# Patient Record
Sex: FEMALE | Race: OTHER | Employment: FULL TIME | URBAN - METROPOLITAN AREA
[De-identification: names, ages, dates, MRNs, and addresses within clinical notes are randomized per-mention and may not be internally consistent; named-entity substitution may affect disease eponyms.]

---

## 2019-01-24 ENCOUNTER — HOSPITAL ENCOUNTER (EMERGENCY)
Age: 39
Discharge: HOME | End: 2019-01-24
Payer: COMMERCIAL

## 2019-01-24 VITALS
OXYGEN SATURATION: 97 % | HEIGHT: 63 IN | SYSTOLIC BLOOD PRESSURE: 130 MMHG | RESPIRATION RATE: 15 BRPM | BODY MASS INDEX: 25.69 KG/M2 | DIASTOLIC BLOOD PRESSURE: 86 MMHG | TEMPERATURE: 98.7 F | HEART RATE: 75 BPM | WEIGHT: 145 LBS

## 2019-01-24 DIAGNOSIS — R68.84 JAW PAIN: Primary | ICD-10-CM

## 2019-01-24 DIAGNOSIS — K08.89 PAIN, DENTAL: ICD-10-CM

## 2019-01-24 PROCEDURE — 6370000000 HC RX 637 (ALT 250 FOR IP): Performed by: PHYSICIAN ASSISTANT

## 2019-01-24 PROCEDURE — 99282 EMERGENCY DEPT VISIT SF MDM: CPT

## 2019-01-24 RX ADMIN — LIDOCAINE HYDROCHLORIDE 15 ML: 20 SOLUTION ORAL; TOPICAL at 11:38

## 2019-01-24 ASSESSMENT — ENCOUNTER SYMPTOMS
COUGH: 0
FACIAL SWELLING: 0
ABDOMINAL PAIN: 0
CHEST TIGHTNESS: 0
EYE REDNESS: 0
SORE THROAT: 0
EYE PAIN: 0
BACK PAIN: 0
DIARRHEA: 0
CONSTIPATION: 0
SHORTNESS OF BREATH: 0
RHINORRHEA: 0
NAUSEA: 0

## 2019-01-24 ASSESSMENT — PAIN DESCRIPTION - ORIENTATION: ORIENTATION: LEFT

## 2019-01-24 ASSESSMENT — PAIN DESCRIPTION - PAIN TYPE: TYPE: ACUTE PAIN

## 2019-01-24 ASSESSMENT — PAIN DESCRIPTION - LOCATION: LOCATION: MOUTH

## 2019-01-24 ASSESSMENT — PAIN SCALES - GENERAL: PAINLEVEL_OUTOF10: 9

## 2021-07-24 ENCOUNTER — HOSPITAL ENCOUNTER (EMERGENCY)
Age: 41
Discharge: HOME | End: 2021-07-24
Attending: EMERGENCY MEDICINE
Payer: COMMERCIAL

## 2021-07-24 ENCOUNTER — APPOINTMENT (OUTPATIENT)
Dept: GENERAL RADIOLOGY | Age: 41
End: 2021-07-24
Payer: COMMERCIAL

## 2021-07-24 VITALS
SYSTOLIC BLOOD PRESSURE: 127 MMHG | HEIGHT: 63 IN | HEART RATE: 65 BPM | DIASTOLIC BLOOD PRESSURE: 89 MMHG | OXYGEN SATURATION: 99 % | BODY MASS INDEX: 25.69 KG/M2 | TEMPERATURE: 98.4 F | WEIGHT: 145 LBS | RESPIRATION RATE: 26 BRPM

## 2021-07-24 DIAGNOSIS — R42 DIZZINESS: Primary | ICD-10-CM

## 2021-07-24 LAB
A/G RATIO: 1.6 (ref 1.1–2.2)
ALBUMIN SERPL-MCNC: 4.5 G/DL (ref 3.4–5)
ALP BLD-CCNC: 67 U/L (ref 40–129)
ALT SERPL-CCNC: 15 U/L (ref 10–40)
ANION GAP SERPL CALCULATED.3IONS-SCNC: 10 MMOL/L (ref 3–16)
AST SERPL-CCNC: 17 U/L (ref 15–37)
BASOPHILS ABSOLUTE: 0 K/UL (ref 0–0.2)
BASOPHILS RELATIVE PERCENT: 0.8 %
BILIRUB SERPL-MCNC: 0.7 MG/DL (ref 0–1)
BILIRUBIN URINE: NEGATIVE
BLOOD, URINE: NEGATIVE
BUN BLDV-MCNC: 14 MG/DL (ref 7–20)
CALCIUM SERPL-MCNC: 9.6 MG/DL (ref 8.3–10.6)
CHLORIDE BLD-SCNC: 101 MMOL/L (ref 99–110)
CLARITY: CLEAR
CO2: 27 MMOL/L (ref 21–32)
COLOR: YELLOW
CREAT SERPL-MCNC: 0.6 MG/DL (ref 0.6–1.1)
EKG ATRIAL RATE: 53 BPM
EKG DIAGNOSIS: NORMAL
EKG P AXIS: 12 DEGREES
EKG P-R INTERVAL: 158 MS
EKG Q-T INTERVAL: 442 MS
EKG QRS DURATION: 128 MS
EKG QTC CALCULATION (BAZETT): 414 MS
EKG R AXIS: -11 DEGREES
EKG T AXIS: -4 DEGREES
EKG VENTRICULAR RATE: 53 BPM
EOSINOPHILS ABSOLUTE: 0.1 K/UL (ref 0–0.6)
EOSINOPHILS RELATIVE PERCENT: 2.4 %
GFR AFRICAN AMERICAN: >60
GFR NON-AFRICAN AMERICAN: >60
GLOBULIN: 2.9 G/DL
GLUCOSE BLD-MCNC: 83 MG/DL (ref 70–99)
GLUCOSE URINE: NEGATIVE MG/DL
HCG QUALITATIVE: NEGATIVE
HCT VFR BLD CALC: 40.4 % (ref 36–48)
HEMOGLOBIN: 14.2 G/DL (ref 12–16)
KETONES, URINE: NEGATIVE MG/DL
LEUKOCYTE ESTERASE, URINE: NEGATIVE
LYMPHOCYTES ABSOLUTE: 1.9 K/UL (ref 1–5.1)
LYMPHOCYTES RELATIVE PERCENT: 34.1 %
MCH RBC QN AUTO: 31.8 PG (ref 26–34)
MCHC RBC AUTO-ENTMCNC: 35.1 G/DL (ref 31–36)
MCV RBC AUTO: 90.6 FL (ref 80–100)
MICROSCOPIC EXAMINATION: NORMAL
MONOCYTES ABSOLUTE: 0.4 K/UL (ref 0–1.3)
MONOCYTES RELATIVE PERCENT: 7.2 %
NEUTROPHILS ABSOLUTE: 3.2 K/UL (ref 1.7–7.7)
NEUTROPHILS RELATIVE PERCENT: 55.5 %
NITRITE, URINE: NEGATIVE
PDW BLD-RTO: 12.8 % (ref 12.4–15.4)
PH UA: 8 (ref 5–8)
PLATELET # BLD: 198 K/UL (ref 135–450)
PMV BLD AUTO: 10.2 FL (ref 5–10.5)
POTASSIUM SERPL-SCNC: 4.4 MMOL/L (ref 3.5–5.1)
PROTEIN UA: NEGATIVE MG/DL
RBC # BLD: 4.46 M/UL (ref 4–5.2)
SODIUM BLD-SCNC: 138 MMOL/L (ref 136–145)
SPECIFIC GRAVITY UA: 1.02 (ref 1–1.03)
TOTAL PROTEIN: 7.4 G/DL (ref 6.4–8.2)
TROPONIN: <0.01 NG/ML
TROPONIN: <0.01 NG/ML
URINE TYPE: NORMAL
UROBILINOGEN, URINE: 0.2 E.U./DL
WBC # BLD: 5.7 K/UL (ref 4–11)

## 2021-07-24 PROCEDURE — 85025 COMPLETE CBC W/AUTO DIFF WBC: CPT

## 2021-07-24 PROCEDURE — 6370000000 HC RX 637 (ALT 250 FOR IP): Performed by: PHYSICIAN ASSISTANT

## 2021-07-24 PROCEDURE — 99285 EMERGENCY DEPT VISIT HI MDM: CPT

## 2021-07-24 PROCEDURE — 84703 CHORIONIC GONADOTROPIN ASSAY: CPT

## 2021-07-24 PROCEDURE — 93010 ELECTROCARDIOGRAM REPORT: CPT | Performed by: INTERNAL MEDICINE

## 2021-07-24 PROCEDURE — 80053 COMPREHEN METABOLIC PANEL: CPT

## 2021-07-24 PROCEDURE — 93005 ELECTROCARDIOGRAM TRACING: CPT | Performed by: EMERGENCY MEDICINE

## 2021-07-24 PROCEDURE — 84484 ASSAY OF TROPONIN QUANT: CPT

## 2021-07-24 PROCEDURE — 71046 X-RAY EXAM CHEST 2 VIEWS: CPT

## 2021-07-24 PROCEDURE — 2580000003 HC RX 258: Performed by: PHYSICIAN ASSISTANT

## 2021-07-24 PROCEDURE — 81003 URINALYSIS AUTO W/O SCOPE: CPT

## 2021-07-24 RX ORDER — 0.9 % SODIUM CHLORIDE 0.9 %
1000 INTRAVENOUS SOLUTION INTRAVENOUS ONCE
Status: COMPLETED | OUTPATIENT
Start: 2021-07-24 | End: 2021-07-24

## 2021-07-24 RX ORDER — MECLIZINE HCL 12.5 MG/1
12.5 TABLET ORAL ONCE
Status: COMPLETED | OUTPATIENT
Start: 2021-07-24 | End: 2021-07-24

## 2021-07-24 RX ORDER — MECLIZINE HYDROCHLORIDE 25 MG/1
25 TABLET ORAL 2 TIMES DAILY
Qty: 60 TABLET | Refills: 0 | Status: SHIPPED | OUTPATIENT
Start: 2021-07-24 | End: 2021-08-23

## 2021-07-24 RX ADMIN — SODIUM CHLORIDE 1000 ML: 9 INJECTION, SOLUTION INTRAVENOUS at 12:14

## 2021-07-24 RX ADMIN — MECLIZINE 12.5 MG: 12.5 TABLET ORAL at 12:14

## 2021-07-24 ASSESSMENT — HEART SCORE: ECG: 1

## 2021-07-24 ASSESSMENT — ENCOUNTER SYMPTOMS
SHORTNESS OF BREATH: 0
NAUSEA: 0
CHEST TIGHTNESS: 0
ABDOMINAL PAIN: 0
COUGH: 0
BACK PAIN: 0
VOMITING: 0

## 2021-07-24 NOTE — ED PROVIDER NOTES
I independently performed a history and physical on Riley Woods. All diagnostic, treatment, and disposition decisions were made by myself in conjunction with the advanced practice provider. For further details of [facility] emergency department encounter, please see the HAVEN/resident's documentation. Briefly, this is a 74-year-old female with history of diabetes, hypertension who presents emerged department for evaluation of chest pain, dizziness. Patient was evaluated by PCP last week and was found to have a left bundle branch block. Patient has had the symptoms of chest pain, intermittent dizziness for the past 5 months. She currently has follow-up scheduled with cardiology, MRI of the head, echocardiogram.  She has not taken over-the-counter meclizine which was recommended to her. Patient arrives with stable vital signs. On exam she has intact distal pulses. EKG reveals stable left bundle branch block without new ischemic change. ED evaluation here included basic labs, cardiac panel. This was unremarkable. We discussed her case with on-call cardiology, Dr. Jez Morris, recommended orthostatic vitals and if unremarkable does not require admission from a cardiology standpoint. EKG  The Ekg interpreted by myself in the emergency department in the absence of a cardiologist.  sinus bradycardia, rate=53 with a rate of   Axis is   Left axis deviation  QTc is  414  Left bundle branch block. QRS duration 120 ms  No specific ST-T wave changes appreciated. No evidence of acute ischemia.    Previous EKG not available for comparison       Raj Browne MD  07/24/21 1564

## 2021-07-24 NOTE — ED PROVIDER NOTES
**ADVANCED PRACTICE PROVIDER, I HAVE EVALUATED THIS [facility]  ED  EMERGENCY DEPARTMENT ENCOUNTER      Pt Name: Lisette Everett  ZJN:[id number]  Birthdate 1980  Date of evaluation: 7/24/2021  Provider: BING Sow      Chief Complaint:    Chief Complaint   Patient presents with    Chest Pain     pt reporting dizziness for two weeks. saw her PCP had a work up, MRI ordered out patient. pt reporting chest pain since Tuesday. states nothing makes it better or worse.  Dizziness       Nursing Notes, Past Medical Hx, Past Surgical Hx, Social Hx, Allergies, and Family Hx were all reviewed and agreed with or any disagreements were addressed in the HPI.    HPI: (Location, Duration, Timing, Severity, Quality, Assoc Sx, Context, Modifying factors)    Chief Complaint of chest pain and dizziness. This is a  46 y.o. female who presents to the emergency department with 5-month history of dizziness and chest pain. She states for the past week her dizziness has been worse than normal and beginning on Tuesday it seems to be constant. Last night and today she states that it is worse which is why she came into the emergency department. She describes it as unsteadiness while she was walking, she denies the room spinning and states it does not feel like a lightheaded sensation. She denies any change in vision. She was seen by her PCP last week who did an EKG and found a new left bundle branch block. At that time he referred her for 24-hour Holter monitor which she completed on Monday. She does not know the results yet. She was also referred to MRI of her head to to chronic headaches, she has a scheduled next Friday. She is also been referred to cardiology for an echo which she is scheduled with [facility] August 5. She states she gets intermittent chest pain and describes it as sharp located both on the left side of her chest and on the right.   She states occasionally she will have a sharp shooting pain on bilateral sides of her neck as well. She denies any cardiac history. She denies any family cardiac history. She does not take any medications on a daily basis. Workup to be completed by PCP per chart review:    MRI-head with and without contrast    24 HOUR HOLTER MONITOR    Comprehensive metabolic panel    TCH CBC with Differential    Tsh (thyroid stimulating hormone)    ENT Referral (Internal)    Cardiology Referral (Internal)    EKG (In Clinic)    2D ECHO COMPLETE PRN 3D       PastMedical/Surgical History:      Diagnosis Date    Dizziness     Gestational diabetes     Hypertension     during pregnancy         Procedure Laterality Date    APPENDECTOMY  2002    OTHER SURGICAL HISTORY Left 8/21/15    excision left buccal mass       Medications:  Discharge Medication List as of 7/24/2021  3:03 PM            Review of Systems:  (2-9 systems needed)  Review of Systems   Constitutional: Negative for chills, fatigue and fever. HENT: Negative for congestion. Eyes: Negative for visual disturbance. Respiratory: Negative for cough, chest tightness and shortness of breath. Cardiovascular: Positive for chest pain. Negative for palpitations. Gastrointestinal: Negative for abdominal pain, nausea and vomiting. Genitourinary: Negative for dysuria, frequency and urgency. Musculoskeletal: Negative for back pain. Skin: Negative for rash. Neurological: Positive for dizziness. Negative for syncope, weakness, light-headedness and headaches. \"Positives and Pertinent negatives as per HPI\"    Physical Exam:  Physical Exam  Vitals and nursing note reviewed. Constitutional:       Appearance: Normal appearance. She is not diaphoretic. HENT:      Head: Normocephalic and atraumatic. Nose: Nose normal.      Mouth/Throat:      Mouth: Mucous membranes are moist.   Eyes:      General:         Right eye: No discharge. Left eye: No discharge.       Extraocular Movements: Extraocular movements intact. Conjunctiva/sclera: Conjunctivae normal.      Pupils: Pupils are equal, round, and reactive to light. Cardiovascular:      Rate and Rhythm: Normal rate and regular rhythm. Pulses: Normal pulses. Heart sounds: Normal heart sounds. No murmur heard. No friction rub. No gallop. Pulmonary:      Effort: Pulmonary effort is normal. No respiratory distress. Breath sounds: Normal breath sounds. No stridor. No wheezing, rhonchi or rales. Abdominal:      General: Abdomen is flat. Bowel sounds are normal. There is no distension. Palpations: Abdomen is soft. Tenderness: There is no abdominal tenderness. There is no right CVA tenderness, left CVA tenderness or guarding. Musculoskeletal:         General: Normal range of motion. Cervical back: Normal range of motion and neck supple. Skin:     General: Skin is warm and dry. Coloration: Skin is not pale. Neurological:      Mental Status: She is alert and oriented to person, place, and time. GCS: GCS eye subscore is 4. GCS verbal subscore is 5. GCS motor subscore is 6. Cranial Nerves: Cranial nerves are intact. Sensory: Sensation is intact. Motor: Motor function is intact. Coordination: Coordination is intact. Gait: Gait is intact.    Psychiatric:         Mood and Affect: Mood normal.         Behavior: Behavior normal.         MEDICAL DECISION MAKING    Vitals:    Vitals:    07/24/21 1216 07/24/21 1246 07/24/21 1330 07/24/21 1446   BP: 113/81   127/89   Pulse: 59 59 57 65   Resp: 16  15 26   Temp:       TempSrc:       SpO2: 99%  100% 99%   Weight:       Height:           LABS:  Labs Reviewed   CBC WITH AUTO DIFFERENTIAL    Narrative:     Performed at:  [facility]) [address]   Phone [phone]   COMPREHENSIVE METABOLIC PANEL    Narrative:     Performed at:  [facility]  [address],  [place], [place] [employer]   Phone [phone]   TROPONIN    Narrative:     Performed at:  [address], [place] [employer]   Phone [phone]   HCG, SERUM, QUALITATIVE    Narrative:     Performed at:  [facility]  [address] [facility], [place] [employer] [place]   Phone [phone]   URINALYSIS    Narrative:     Performed at:  [address], [employer] [employer] [place]   Phone [phone]   TROPONIN    Narrative:     Performed at:  [address] [place] [address], [employer] [place]   Phone  of labs reviewed and were negative at this time or not returned at the time of this note. RADIOLOGY:   Non-plain film images such as CT, Ultrasound and MRI are read by the radiologist. BING Caraballo have directly visualized the radiologic plain film image(s) with the below findings:      Interpretation per the Radiologist below, if available at the time of this note:    XR CHEST (2 VW)   Final Result   No active cardiopulmonary disease              XR CHEST (2 VW)    Result Date: 7/24/2021  EXAMINATION: TWO XRAY VIEWS OF THE CHEST 7/24/2021 11:21 am COMPARISON: None. HISTORY: ORDERING SYSTEM PROVIDED HISTORY: chest pain TECHNOLOGIST PROVIDED HISTORY: Reason for exam:->chest pain Reason for Exam: Chest Pain (pt reporting dizziness for two weeks. saw her PCP had a work up, MRI ordered out patient. pt reporting chest pain since Tuesday. states nothing makes it better or worse. ) FINDINGS: Heart size and pulmonary vasculature within normal limits. Lungs clear. Costophrenic angles sharp     No active cardiopulmonary disease          MEDICAL DECISION MAKING / ED COURSE:      Patient was seen and evaluated by myself and attending physician.   All diagnostic, treatment, and disposition decisions made in which has helped her symptoms. She will be discharged home with prescription for meclizine. All questions were answered and the patient is discharged in good condition. The patient tolerated their visit well. I evaluated the patient. The physician was available for consultation as needed. The patient and / or the family were informed of the results of any tests, a time was given to answer questions, a plan was proposed and they agreed with plan.     Results for orders placed or performed during the hospital encounter of 07/24/21   CBC auto differential   Result Value Ref Range    WBC 5.7 4.0 - 11.0 K/uL    RBC 4.46 4.00 - 5.20 M/uL    Hemoglobin 14.2 12.0 - 16.0 g/dL    Hematocrit 40.4 36.0 - 48.0 %    MCV 90.6 80.0 - 100.0 fL    MCH 31.8 26.0 - 34.0 pg    MCHC 35.1 31.0 - 36.0 g/dL    RDW 12.8 12.4 - 15.4 %    Platelets 740 290 - 326 K/uL    MPV 10.2 5.0 - 10.5 fL    Neutrophils % 55.5 %    Lymphocytes % 34.1 %    Monocytes % 7.2 %    Eosinophils % 2.4 %    Basophils % 0.8 %    Neutrophils Absolute 3.2 1.7 - 7.7 K/uL    Lymphocytes Absolute 1.9 1.0 - 5.1 K/uL    Monocytes Absolute 0.4 0.0 - 1.3 K/uL    Eosinophils Absolute 0.1 0.0 - 0.6 K/uL    Basophils Absolute 0.0 0.0 - 0.2 K/uL   Comprehensive metabolic panel   Result Value Ref Range    Sodium 138 136 - 145 mmol/L    Potassium 4.4 3.5 - 5.1 mmol/L    Chloride 101 99 - 110 mmol/L    CO2 27 21 - 32 mmol/L    Anion Gap 10 3 - 16    Glucose 83 70 - 99 mg/dL    BUN 14 7 - 20 mg/dL    CREATININE 0.6 0.6 - 1.1 mg/dL    GFR Non-African American >60 >60    GFR African American >60 >60    Calcium 9.6 8.3 - 10.6 mg/dL    Total Protein 7.4 6.4 - 8.2 g/dL    Albumin 4.5 3.4 - 5.0 g/dL    Albumin/Globulin Ratio 1.6 1.1 - 2.2    Total Bilirubin 0.7 0.0 - 1.0 mg/dL    Alkaline Phosphatase 67 40 - 129 U/L    ALT 15 10 - 40 U/L    AST 17 15 - 37 U/L    Globulin 2.9 g/dL   Troponin   Result Value Ref Range    Troponin <0.01 <0.01 ng/mL   HCG Qualitative, Serum   Result Value Ref Range    hCG Qual Negative Detects HCG level >10 MIU/mL   Urinalysis, reflex to microscopic   Result Value Ref Range    Color, UA Yellow Straw/Yellow    Clarity, UA Clear Clear    Glucose, Ur Negative Negative mg/dL    Bilirubin Urine Negative Negative    Ketones, Urine Negative Negative mg/dL    Specific Gravity, UA 1.020 1.005 - 1.030    Blood, Urine Negative Negative    pH, UA 8.0 5.0 - 8.0    Protein, UA Negative Negative mg/dL    Urobilinogen, Urine 0.2 <2.0 E.U./dL    Nitrite, Urine Negative Negative    Leukocyte Esterase, Urine Negative Negative    Microscopic Examination Not Indicated     Urine Type NotGiven    Troponin   Result Value Ref Range    Troponin <0.01 <0.01 ng/mL   EKG 12 Lead   Result Value Ref Range    Ventricular Rate 53 BPM    Atrial Rate 53 BPM    P-R Interval 158 ms    QRS Duration 128 ms    Q-T Interval 442 ms    QTc Calculation (Bazett) 414 ms    P Axis 12 degrees    R Axis -11 degrees    T Axis -4 degrees    Diagnosis       Sinus bradycardiaLeft bundle branch blockAbnormal ECGNo previous ECGs availableConfirmed by Jose Kohler ([id number]) on 7/24/2021 2:00:15 PM       I estimate there is LOW risk for ACUTE CORONARY SYNDROME, INTRACRANIAL HEMORRHAGE, MALIGNANT DYSRHYTHMIA or HYPERTENSION, PULMONARY EMBOLISM, SEPSIS, SUBARACHNOID HEMORRHAGE, SUBDURAL HEMATOMA, STROKE, or THORACIC AORTIC DISSECTION, thus I consider the discharge disposition reasonable. Riley Woods and I have discussed the diagnosis and risks, and we agree with discharging home to follow-up with their primary doctor. We also discussed returning to the Emergency Department immediately if new or worsening symptoms occur. We have discussed the symptoms which are most concerning (e.g., bloody sputum, fever, worsening pain or shortness of breath, vomiting, weakness) that necessitate immediate return. Final Impression    1.  Dizziness        Blood pressure 127/89, pulse 65, temperature 98.4 °F (36.9 °C), temperature source Oral, resp. rate 26, height 5' 3\" (1.6 m), weight 145 lb (65.8 kg), last menstrual period 07/17/2021, SpO2 99 %, unknown if currently breastfeeding. CLINICAL IMPRESSION:  1.  Dizziness        DISPOSITION Decision To Discharge 07/24/2021 01:45:10 PM      PATIENT REFERRED TO:  Mckinley Norris MD  [address] [facility] [id number] [place] [address]  ED  [address] [address] [phone]  Go to   If symptoms worsen      DISCHARGE MEDICATIONS:  Discharge Medication List as of 7/24/2021  3:03 PM      START taking these medications    Details   meclizine (ANTIVERT) 25 MG tablet Take 1 tablet by mouth 2 times daily, Disp-60 tablet, R-0Normal             DISCONTINUED MEDICATIONS:  Discharge Medication List as of 7/24/2021  3:03 PM      STOP taking these medications       lidocaine viscous (XYLOCAINE) 2 % solution Comments:   Reason for Stopping:                      (Please note the MDM and HPI sections of this note were completed with a voice recognition program.  Efforts were made to edit the dictations but occasionally words are mis-transcribed.)    Electronically signed, Andrey Wilcox,           Andrey Wilcox  07/24/21 9200

## 2023-07-06 ENCOUNTER — APPOINTMENT (OUTPATIENT)
Dept: GENERAL RADIOLOGY | Age: 43
DRG: 313 | End: 2023-07-06
Payer: COMMERCIAL

## 2023-07-06 ENCOUNTER — HOSPITAL ENCOUNTER (INPATIENT)
Age: 43
LOS: 2 days | Discharge: HOME | DRG: 313 | End: 2023-07-08
Attending: STUDENT IN AN ORGANIZED HEALTH CARE EDUCATION/TRAINING PROGRAM | Admitting: PEDIATRICS
Payer: COMMERCIAL

## 2023-07-06 ENCOUNTER — APPOINTMENT (OUTPATIENT)
Dept: CT IMAGING | Age: 43
DRG: 313 | End: 2023-07-06
Payer: COMMERCIAL

## 2023-07-06 DIAGNOSIS — R20.0 NUMBNESS: ICD-10-CM

## 2023-07-06 DIAGNOSIS — R07.9 CHEST PAIN, UNSPECIFIED TYPE: Primary | ICD-10-CM

## 2023-07-06 LAB
ALBUMIN SERPL-MCNC: 4.5 G/DL (ref 3.4–5)
ALBUMIN/GLOB SERPL: 1.8 {RATIO} (ref 1.1–2.2)
ALP SERPL-CCNC: 55 U/L (ref 40–129)
ALT SERPL-CCNC: 23 U/L (ref 10–40)
ANION GAP SERPL CALCULATED.3IONS-SCNC: 14 MMOL/L (ref 3–16)
AST SERPL-CCNC: 20 U/L (ref 15–37)
BASOPHILS # BLD: 0 K/UL (ref 0–0.2)
BASOPHILS NFR BLD: 0.4 %
BILIRUB SERPL-MCNC: 0.6 MG/DL (ref 0–1)
BUN SERPL-MCNC: 16 MG/DL (ref 7–20)
CALCIUM SERPL-MCNC: 9.6 MG/DL (ref 8.3–10.6)
CHLORIDE SERPL-SCNC: 103 MMOL/L (ref 99–110)
CO2 SERPL-SCNC: 21 MMOL/L (ref 21–32)
CREAT SERPL-MCNC: 0.8 MG/DL (ref 0.6–1.1)
DEPRECATED RDW RBC AUTO: 13.2 % (ref 12.4–15.4)
EKG ATRIAL RATE: 63 BPM
EKG DIAGNOSIS: NORMAL
EKG P AXIS: 21 DEGREES
EKG P-R INTERVAL: 172 MS
EKG Q-T INTERVAL: 450 MS
EKG QRS DURATION: 132 MS
EKG QTC CALCULATION (BAZETT): 460 MS
EKG R AXIS: -20 DEGREES
EKG T AXIS: 59 DEGREES
EKG VENTRICULAR RATE: 63 BPM
EOSINOPHIL # BLD: 0.1 K/UL (ref 0–0.6)
EOSINOPHIL NFR BLD: 1.6 %
GFR SERPLBLD CREATININE-BSD FMLA CKD-EPI: >60 ML/MIN/{1.73_M2}
GLUCOSE BLD-MCNC: 83 MG/DL
GLUCOSE BLD-MCNC: 83 MG/DL (ref 70–99)
GLUCOSE SERPL-MCNC: 86 MG/DL (ref 70–99)
HCG SERPL QL: NEGATIVE
HCT VFR BLD AUTO: 39.1 % (ref 36–48)
HGB BLD-MCNC: 13.4 G/DL (ref 12–16)
INR PPP: 0.92 (ref 0.84–1.16)
LYMPHOCYTES # BLD: 2.6 K/UL (ref 1–5.1)
LYMPHOCYTES NFR BLD: 32.3 %
MCH RBC QN AUTO: 31.1 PG (ref 26–34)
MCHC RBC AUTO-ENTMCNC: 34.4 G/DL (ref 31–36)
MCV RBC AUTO: 90.2 FL (ref 80–100)
MONOCYTES # BLD: 0.5 K/UL (ref 0–1.3)
MONOCYTES NFR BLD: 6.7 %
NEUTROPHILS # BLD: 4.8 K/UL (ref 1.7–7.7)
NEUTROPHILS NFR BLD: 59 %
PERFORMED ON: NORMAL
PLATELET # BLD AUTO: 166 K/UL (ref 135–450)
PMV BLD AUTO: 11.3 FL (ref 5–10.5)
POTASSIUM SERPL-SCNC: 3.8 MMOL/L (ref 3.5–5.1)
PROT SERPL-MCNC: 7 G/DL (ref 6.4–8.2)
PROTHROMBIN TIME: 12.3 SEC (ref 11.5–14.8)
RBC # BLD AUTO: 4.33 M/UL (ref 4–5.2)
SODIUM SERPL-SCNC: 138 MMOL/L (ref 136–145)
TROPONIN, HIGH SENSITIVITY: 9 NG/L (ref 0–14)
WBC # BLD AUTO: 8.1 K/UL (ref 4–11)

## 2023-07-06 PROCEDURE — 6370000000 HC RX 637 (ALT 250 FOR IP): Performed by: STUDENT IN AN ORGANIZED HEALTH CARE EDUCATION/TRAINING PROGRAM

## 2023-07-06 PROCEDURE — 36415 COLL VENOUS BLD VENIPUNCTURE: CPT

## 2023-07-06 PROCEDURE — 70450 CT HEAD/BRAIN W/O DYE: CPT

## 2023-07-06 PROCEDURE — 93005 ELECTROCARDIOGRAM TRACING: CPT | Performed by: STUDENT IN AN ORGANIZED HEALTH CARE EDUCATION/TRAINING PROGRAM

## 2023-07-06 PROCEDURE — 85025 COMPLETE CBC W/AUTO DIFF WBC: CPT

## 2023-07-06 PROCEDURE — 93010 ELECTROCARDIOGRAM REPORT: CPT | Performed by: INTERNAL MEDICINE

## 2023-07-06 PROCEDURE — 84484 ASSAY OF TROPONIN QUANT: CPT

## 2023-07-06 PROCEDURE — 6370000000 HC RX 637 (ALT 250 FOR IP): Performed by: PEDIATRICS

## 2023-07-06 PROCEDURE — 71045 X-RAY EXAM CHEST 1 VIEW: CPT

## 2023-07-06 PROCEDURE — 85610 PROTHROMBIN TIME: CPT

## 2023-07-06 PROCEDURE — 84703 CHORIONIC GONADOTROPIN ASSAY: CPT

## 2023-07-06 PROCEDURE — 74174 CTA ABD&PLVS W/CONTRAST: CPT

## 2023-07-06 PROCEDURE — 80053 COMPREHEN METABOLIC PANEL: CPT

## 2023-07-06 PROCEDURE — 6360000004 HC RX CONTRAST MEDICATION: Performed by: STUDENT IN AN ORGANIZED HEALTH CARE EDUCATION/TRAINING PROGRAM

## 2023-07-06 PROCEDURE — 2060000000 HC ICU INTERMEDIATE R&B

## 2023-07-06 PROCEDURE — 99285 EMERGENCY DEPT VISIT HI MDM: CPT

## 2023-07-06 RX ORDER — ONDANSETRON 4 MG/1
4 TABLET, ORALLY DISINTEGRATING ORAL EVERY 8 HOURS PRN
Status: DISCONTINUED | OUTPATIENT
Start: 2023-07-06 | End: 2023-07-08 | Stop reason: HOSPADM

## 2023-07-06 RX ORDER — ASPIRIN 325 MG
325 TABLET ORAL ONCE
Status: COMPLETED | OUTPATIENT
Start: 2023-07-06 | End: 2023-07-06

## 2023-07-06 RX ORDER — ASPIRIN 300 MG/1
300 SUPPOSITORY RECTAL DAILY
Status: DISCONTINUED | OUTPATIENT
Start: 2023-07-07 | End: 2023-07-08 | Stop reason: HOSPADM

## 2023-07-06 RX ORDER — LABETALOL HYDROCHLORIDE 5 MG/ML
10 INJECTION, SOLUTION INTRAVENOUS EVERY 10 MIN PRN
Status: DISCONTINUED | OUTPATIENT
Start: 2023-07-06 | End: 2023-07-08 | Stop reason: HOSPADM

## 2023-07-06 RX ORDER — ONDANSETRON 2 MG/ML
4 INJECTION INTRAMUSCULAR; INTRAVENOUS EVERY 6 HOURS PRN
Status: DISCONTINUED | OUTPATIENT
Start: 2023-07-06 | End: 2023-07-08 | Stop reason: HOSPADM

## 2023-07-06 RX ORDER — ENOXAPARIN SODIUM 100 MG/ML
40 INJECTION SUBCUTANEOUS DAILY
Status: DISCONTINUED | OUTPATIENT
Start: 2023-07-07 | End: 2023-07-08 | Stop reason: HOSPADM

## 2023-07-06 RX ORDER — ASPIRIN 81 MG/1
81 TABLET ORAL DAILY
Status: DISCONTINUED | OUTPATIENT
Start: 2023-07-07 | End: 2023-07-08 | Stop reason: HOSPADM

## 2023-07-06 RX ORDER — POLYETHYLENE GLYCOL 3350 17 G/17G
17 POWDER, FOR SOLUTION ORAL DAILY PRN
Status: DISCONTINUED | OUTPATIENT
Start: 2023-07-06 | End: 2023-07-08 | Stop reason: HOSPADM

## 2023-07-06 RX ORDER — ATORVASTATIN CALCIUM 40 MG/1
80 TABLET, FILM COATED ORAL NIGHTLY
Status: DISCONTINUED | OUTPATIENT
Start: 2023-07-06 | End: 2023-07-08 | Stop reason: HOSPADM

## 2023-07-06 RX ADMIN — ASPIRIN 325 MG: 325 TABLET ORAL at 19:14

## 2023-07-06 RX ADMIN — IOPAMIDOL 85 ML: 755 INJECTION, SOLUTION INTRAVENOUS at 16:59

## 2023-07-06 RX ADMIN — ATORVASTATIN CALCIUM 80 MG: 40 TABLET, FILM COATED ORAL at 21:18

## 2023-07-06 ASSESSMENT — PAIN - FUNCTIONAL ASSESSMENT
PAIN_FUNCTIONAL_ASSESSMENT: NONE - DENIES PAIN
PAIN_FUNCTIONAL_ASSESSMENT: NONE - DENIES PAIN

## 2023-07-06 NOTE — ED NOTES
1857-Perfect Serve sent by Dr. Cruz Wilson to Dr. Snehal Lopez for consult.       Ricky Jimenez  07/06/23 1851

## 2023-07-06 NOTE — ED PROVIDER NOTES
[address] [address] ED  EMERGENCY DEPARTMENT ENCOUNTER        Patient Name: Velia Jones  MRN: [id number]  [address] [facility] 1980  Date of evaluation: 7/6/2023  Provider: Crissy Palencia MD  PCP: Raul Drake MD  Note Started: 6:42 PM EDT 7/6/23      CHIEF COMPLAINT  Numbness and chest pain     HISTORY & PHYSICAL     HISTORY OF PRESENT ILLNESS  History from : Patient    Limitations to history : None    Velia Jones is a 41 y.o. female  has a past medical history of Dizziness, Gestational diabetes, and Hypertension. , who presents to the ED complaining of chest pain and left upper extremity numbness. Patient reports onset of symptoms at 2 PM.  She reports left chest pain, sharp pressure that has now resolved. She reports that she had left upper extremity and perioral numbness at the same time. She did have some right upper extremity tingling but this was much less severe than the left side. Denies history of blood clots or active malignancy. Patient denies unilateral leg swelling, hemoptysis, recent travel or surgery/immobilization, or OCP or other hormone use. Patient is not post partum. Family history:   History reviewed. No pertinent family history. Patient does not smoke. Patient denies cocaine or other drug use. Old records reviewed: No pertinent information noted. No other complaints, modifying factors or associated symptoms. Nursing Notes were all reviewed and agreed with or any disagreements were addressed in the HPI. I have reviewed the following from the nursing documentation. Past Medical History:   Diagnosis Date    Dizziness     Gestational diabetes     Hypertension     during pregnancy     Past Surgical History:   Procedure Laterality Date    APPENDECTOMY  2002    OTHER SURGICAL HISTORY Left 8/21/15    excision left buccal mass     History reviewed. No pertinent family history.   Social History     Socioeconomic History    Marital status:      Spouse name:

## 2023-07-06 NOTE — ED NOTES
1931 perfect serve sent to [address] [facility]  07/06/23 1954 1956 consult completed with call back from Dr. Zuleyma Mcnair  07/06/23 1956

## 2023-07-07 ENCOUNTER — APPOINTMENT (OUTPATIENT)
Dept: CT IMAGING | Age: 43
DRG: 313 | End: 2023-07-07
Payer: COMMERCIAL

## 2023-07-07 PROBLEM — I44.7 LBBB (LEFT BUNDLE BRANCH BLOCK): Status: ACTIVE | Noted: 2023-07-07

## 2023-07-07 PROBLEM — I42.9 CARDIOMYOPATHY (HCC): Status: ACTIVE | Noted: 2023-07-07

## 2023-07-07 PROBLEM — R07.9 CHEST PAIN: Status: ACTIVE | Noted: 2023-07-07

## 2023-07-07 LAB
CHOLEST SERPL-MCNC: 168 MG/DL (ref 0–199)
DEPRECATED RDW RBC AUTO: 13.2 % (ref 12.4–15.4)
HCT VFR BLD AUTO: 39.1 % (ref 36–48)
HDLC SERPL-MCNC: 46 MG/DL (ref 40–60)
HGB BLD-MCNC: 13.5 G/DL (ref 12–16)
LDLC SERPL CALC-MCNC: 88 MG/DL
LV EF: 38 %
LVEF MODALITY: NORMAL
MCH RBC QN AUTO: 31 PG (ref 26–34)
MCHC RBC AUTO-ENTMCNC: 34.5 G/DL (ref 31–36)
MCV RBC AUTO: 89.9 FL (ref 80–100)
PLATELET # BLD AUTO: 162 K/UL (ref 135–450)
PMV BLD AUTO: 11.3 FL (ref 5–10.5)
RBC # BLD AUTO: 4.35 M/UL (ref 4–5.2)
TRIGL SERPL-MCNC: 172 MG/DL (ref 0–150)
TROPONIN, HIGH SENSITIVITY: 9 NG/L (ref 0–14)
TSH SERPL DL<=0.005 MIU/L-ACNC: 4.31 UIU/ML (ref 0.27–4.2)
VLDLC SERPL CALC-MCNC: 34 MG/DL
WBC # BLD AUTO: 6.1 K/UL (ref 4–11)

## 2023-07-07 PROCEDURE — 6370000000 HC RX 637 (ALT 250 FOR IP): Performed by: PEDIATRICS

## 2023-07-07 PROCEDURE — 36415 COLL VENOUS BLD VENIPUNCTURE: CPT

## 2023-07-07 PROCEDURE — 83540 ASSAY OF IRON: CPT

## 2023-07-07 PROCEDURE — 99233 SBSQ HOSP IP/OBS HIGH 50: CPT | Performed by: INTERNAL MEDICINE

## 2023-07-07 PROCEDURE — 84484 ASSAY OF TROPONIN QUANT: CPT

## 2023-07-07 PROCEDURE — 6370000000 HC RX 637 (ALT 250 FOR IP): Performed by: INTERNAL MEDICINE

## 2023-07-07 PROCEDURE — 85027 COMPLETE CBC AUTOMATED: CPT

## 2023-07-07 PROCEDURE — 2060000000 HC ICU INTERMEDIATE R&B

## 2023-07-07 PROCEDURE — 75574 CT ANGIO HRT W/3D IMAGE: CPT

## 2023-07-07 PROCEDURE — 97165 OT EVAL LOW COMPLEX 30 MIN: CPT

## 2023-07-07 PROCEDURE — 99222 1ST HOSP IP/OBS MODERATE 55: CPT | Performed by: INTERNAL MEDICINE

## 2023-07-07 PROCEDURE — 6360000004 HC RX CONTRAST MEDICATION: Performed by: PEDIATRICS

## 2023-07-07 PROCEDURE — 80061 LIPID PANEL: CPT

## 2023-07-07 PROCEDURE — C8929 TTE W OR WO FOL WCON,DOPPLER: HCPCS

## 2023-07-07 PROCEDURE — 97161 PT EVAL LOW COMPLEX 20 MIN: CPT

## 2023-07-07 PROCEDURE — 82728 ASSAY OF FERRITIN: CPT

## 2023-07-07 PROCEDURE — 84443 ASSAY THYROID STIM HORMONE: CPT

## 2023-07-07 PROCEDURE — 93308 TTE F-UP OR LMTD: CPT

## 2023-07-07 PROCEDURE — 83036 HEMOGLOBIN GLYCOSYLATED A1C: CPT

## 2023-07-07 PROCEDURE — 83550 IRON BINDING TEST: CPT

## 2023-07-07 PROCEDURE — 84439 ASSAY OF FREE THYROXINE: CPT

## 2023-07-07 PROCEDURE — 2580000003 HC RX 258: Performed by: INTERNAL MEDICINE

## 2023-07-07 PROCEDURE — 6360000004 HC RX CONTRAST MEDICATION: Performed by: INTERNAL MEDICINE

## 2023-07-07 PROCEDURE — 6360000002 HC RX W HCPCS: Performed by: PEDIATRICS

## 2023-07-07 RX ORDER — SODIUM CHLORIDE 9 MG/ML
INJECTION, SOLUTION INTRAVENOUS CONTINUOUS
Status: DISCONTINUED | OUTPATIENT
Start: 2023-07-07 | End: 2023-07-08 | Stop reason: HOSPADM

## 2023-07-07 RX ADMIN — METOPROLOL TARTRATE 25 MG: 25 TABLET, FILM COATED ORAL at 13:17

## 2023-07-07 RX ADMIN — IOPAMIDOL 110 ML: 755 INJECTION, SOLUTION INTRAVENOUS at 15:29

## 2023-07-07 RX ADMIN — ATORVASTATIN CALCIUM 80 MG: 40 TABLET, FILM COATED ORAL at 21:41

## 2023-07-07 RX ADMIN — ASPIRIN 81 MG: 81 TABLET, COATED ORAL at 10:34

## 2023-07-07 RX ADMIN — PERFLUTREN 2 ML: 6.52 INJECTION, SUSPENSION INTRAVENOUS at 09:30

## 2023-07-07 RX ADMIN — ENOXAPARIN SODIUM 40 MG: 100 INJECTION SUBCUTANEOUS at 10:35

## 2023-07-07 RX ADMIN — SODIUM CHLORIDE: 9 INJECTION, SOLUTION INTRAVENOUS at 15:15

## 2023-07-07 NOTE — PROGRESS NOTES
Inpatient Physical Therapy Evaluation, Treatment, & Discharge Summary    Unit: PCU  Date:  7/7/2023  Patient Name:    Aniyah Bañuelos  Admitting diagnosis:  Numbness [R20.0]  Admit Date:  7/6/2023  Precautions/Restrictions/WB Status/ Lines/ Wounds/ Oxygen: Lines (IV)    Treatment Time:  8:20 - 8:30  Treatment Number:  1   Timed Code Treatment Minutes: 0 minutes  Total Treatment Minutes:  10  minutes    Patient Stated Goals for Therapy: \" go home \"          Discharge Recommendations: Home independently  DME needs for discharge: Needs Met       Therapy recommendation for EMS Transport: can transport by wheelchair    Therapy recommendations for staff: Independent for ambulation with use of No AD to/from bathroom  within room  within halls    History of Present Illness:   Aniyah Bañuelos is a 41 y.o. female with pmh of gestational diabetes, gestational hypertension and thrombocytopenia,  who presents with      Bilateral arm numbness L > R, chest pain, perioral numbness that happened while at work around 1400. She reports symptoms started while she was sitting / rest (but it wasn't bad) - she got up to walk outside for her job, felt out. She sat back down and then her arms started tingling. She reports pain in her left sided chest, followed by her left arm becoming tingling, then her right arm started tingling. She also reports her legs started tingling. She reports this last about 45 minutes. She characterizes the left sided chest pain as \"poking\" or \"sharp. \"   She reports she was able to breath but it was difficult to take a deep breath. She reports pain would resolve for about 2 minutes but then it would come back. Sometimes it was hard to take a breath due to the pain - pain was worse with inspiration. Chest pain was only on left. She reports associated dyspnea. She reports feeling calm when this happened, no anxiety. Coworkers told her  that at work she had speech changes- was mumbling.  But General Luna

## 2023-07-07 NOTE — FLOWSHEET NOTE
07/07/23 0720   Handoff   Communication Given Shift Handoff   Handoff Given To LEXI HERNANDEZ   Handoff Received From Aniya Chappell RN   Handoff Communication Face to Face   Time Handoff Given 0720     Report given to Tory HERNANDEZ. Pt in bed resting. Call light is within reach, pt stable. Care transferred at this time.

## 2023-07-07 NOTE — H&P
V2.0  History and Physical      Name:  Toñito Chacon /Age/Sex: 1980  (41 y.o. female)   MRN & CSN:  [id number] & [id number] Encounter Date/Time: 2023 8:00 PM EDT   Location:   PCP: Dianelys Gonsalves MD       Hospital Day: 1    Assessment and Plan:   Toñito Chacon is a 41 y.o. female with a pmh of gestational diabetes, hypertension during pregnancy, dizziness -  who presents with Numbness    Hospital Problems             Last Modified POA    * (Principal) Numbness 2023 Yes         LUE and Perioral numbness:   - aspirin 81 mg po daily   - atorvastatin 80 mg po qhs   - a1c and lipid profile with am labs   - PT/OT   - neuro checks   - brain MRI   - echo   - telemetry     Chest pain, in patient with LBBB present since at least :   - presenting TNI negative   - serial TNI   - echo     Diarrhea:   - c diff rule out     FULL CODE     Disposition:   Current Living situation: at home   Expected Disposition: to home   Estimated D/C: 1-2 days     Diet Diet NPO   DVT Prophylaxis [x] Lovenox, []  Heparin, [] SCDs, [] Ambulation,  [] Eliquis, [] Xarelto, [] Coumadin   Code Status Prior   Surrogate Decision Maker/ POA  - Bautista      Personally reviewed Lab Studies and Imaging              History from:     patient    History of Present Illness:     Chief Complaint: numbness     Toñito Chacon is a 41 y.o. female with pmh of gestational diabetes, gestational hypertension and thrombocytopenia,  who presents with     Bilateral arm numbness L > R, chest pain, perioral numbness that happened while at work around 1400. She reports symptoms started while she was sitting / rest (but it wasn't bad) - she got up to walk outside for her job, felt out. She sat back down and then her arms started tingling. She reports pain in her left sided chest, followed by her left arm becoming tingling, then her right arm started tingling. She also reports her legs started tingling.  She reports this last about 45

## 2023-07-07 NOTE — PROGRESS NOTES
Patient to echo via wheelchair in stable condition  -  denying numbness/tingling.   No deficits noted

## 2023-07-07 NOTE — PROGRESS NOTES
Physical Therapy/Occupational Therapy    Order received and chart reviewed. Attempted to see pt for PT/OT eval. Pt declining therapy at this time 2/2 having a BM in bed. PT/OT offered to assist with pericare and linen change, however pt declined stating she is waiting for the RN who needs a stool sample. Will follow-up as PT/OT schedule and pt status permit. No Charge.     Cricket Duckworth, PT, DPT, OMT-C # [id number]

## 2023-07-07 NOTE — PROGRESS NOTES
Patient brought up from ED. Family at bedside. Tele monitor in place. Primary RN jaison at bedside for admission.

## 2023-07-07 NOTE — PLAN OF CARE
Problem: Discharge Planning  Goal: Discharge to home or other facility with appropriate resources    Patient denying chest pain, dizziness/ lightheadedness. IVF infusing. Echo w/ decreased EF.     Outcome: Progressing

## 2023-07-07 NOTE — PROGRESS NOTES
Progress Note    Admit Date:  7/6/2023    Extremity and perioral numbness, stroke work up  Chest pain     Subjective:  Ms. Clyde Diana reports numbness entirely resolved  Described additional episode of sharp, pleuritic left sided chest pain,  resolved spontaneously   No additional complaints    Objective:   No data found. No intake or output data in the 24 hours ending 07/07/23 1000    Physical Exam:  Gen: No distress. Alert. Pleasant middle aged female   Eyes: PERRL. No sclera icterus. No conjunctival injection. Neck: No JVD. Trachea midline. Resp: No accessory muscle use. No crackles. No wheezes. No rhonchi. CV: Regular rate. Regular rhythm. No murmur. No rub. No edema. Peripheral Pulses: +2 palpable, equal bilaterally   GI: Non-tender. Non-distended. Normal bowel sounds. Skin: Warm and dry. No nodule on exposed extremities. No rash on exposed extremities. M/S: No cyanosis. No joint deformity. No clubbing. Neuro: No facial droop or tongue deviation. No slurred speech or aphasia. No pronator drift. Intact finger-to-nose testing. Moves all 4 extremities spontaneously. Upper and lower extremity strength intact and equal bilaterally. Sensation intact to all 4 extremities. Psych: Oriented x 3. No anxiety or agitation. Chevy Singh MD have reviewed the chart on Aniyah Bañuelos and personally interviewed and examined patient, reviewed the data (labs and imaging) and after discussion with my PA formulated the plan. Agree with note with the following edits. HPI:     I reviewed the patient's Past Medical History, Past Surgical History, Medications, and Allergies.            Pt from [place] with hx of LBBB but no hx of heart disease otherwise  Reports her perioral numbness and left arm tingling resolved since yesterday but had intermittent sharp left sided chest pains that last few seconds  Denies any exertional dyspnea        General: middle aged [place] female healthy appearing   Awake,

## 2023-07-07 NOTE — PROGRESS NOTES
Patient admitted to room 307 from ED. Patient oriented to room, call light, bed rails, phone, lights and bathroom. Patient instructed about the schedule of the day including: vital sign frequency, lab draws, possible tests, frequency of MD and staff rounds, daily weights, I &O's and prescribed diet. 10 Telemetry box in place, patient aware of placement and reason. Bed locked, in lowest position, side rails up 2/4, call light within reach. Recliner Assessment  Patient is able to demonstrate the ability to move from a reclining position to an upright position within the recliner. 4 Eyes Skin Assessment     The patient is being assess for   Admission    I agree that 2 RN's have performed a thorough Head to Toe Skin Assessment on the patient. ALL assessment sites listed below have been assessed. Areas assessed for pressure by both nurses:   [x]   Head, Face, and Ears   [x]   Shoulders, Back, and Chest, Abdomen  [x]   Arms, Elbows, and Hands   [x]   Coccyx, Sacrum, and Ischium  [x]   Legs, Feet, and Heels        Skin Assessed Under all Medical Devices by both nurses:  None               All Mepilex Borders were peeled back and area peeked at by both nurses:  N/A  Please list where Mepilex Borders are located:  N/A             **SHARE this note so that the co-signing nurse is able to place an eSignature**    Co-signer eSignature: Electronically signed by Jackie Lester RN on 7/6/23 at 11:48 PM EDT    Does the Patient have Skin Breakdown related to pressure?   No            Cody Prevention initiated:  No   Wound Care Orders initiated:  No      [facility] nurse consulted for Pressure Injury (Stage 3,4, Unstageable, DTI, NWPT, Complex wounds)and New or Established Ostomies:  NA      Primary Nurse eSignature: Electronically signed by Ivania Al RN on 7/6/23 at 11:37 PM EDT

## 2023-07-07 NOTE — CONSULTS
[address] [place] [address]  [phone]        Reason for Consultation/Chief Complaint: \"I have been asked to see her for low EF complete LBBB\"  LOW EF atypical chest pain left arm numbness tingling  History of Present Illness:  Reddy Chapin is a 41 y.o. patient who presented to the hospital with complaints of left arm numbness and tingling on day of admission. She also had twinges of left ant chest pain with worsening on deep breath. No cough shortness of breath. Diarrhea couple of days ago resolved now. She has chronic LBBB but no diagnosis of CAD MI or cardiomyopathy. Currently has no symptoms. Echo today shows low EF 35-40% with segmental wll motion abnormality consistent with LBBB. Mild mitral regurg. Unfortunately it was ordered as a limited study but I did not see any gross valvular pathology. I have been asked to provide consultation regarding further management and testing. Past Medical History:   has a past medical history of Dizziness, Gestational diabetes, and Hypertension. Surgical History:   has a past surgical history that includes Appendectomy (2002) and other surgical history (Left, 8/21/15). Social History:   reports that she has quit smoking. She smoked an average of .5 packs per day. She has never used smokeless tobacco. She reports current alcohol use. She reports that she does not use drugs. She does not drink alcohol regularly   Family History:  Parents mom fast heart beat  no MI in family or sudden death  family history is not on file. Home Medications:  Were reviewed and are listed in nursing record. and/or listed below  Prior to Admission medications    Not on File        Allergies:  Patient has no known allergies.      Review of Systems:   12 point ROS negative in all areas as listed below except as in [place]  Constitutional, EENT, pulmonary, GI, , Musculoskeletal, skin, hematological, endocrine, Psychiatric    Physical Examination:    Vitals:

## 2023-07-07 NOTE — CARE COORDINATION
Review of chart for any potential discharge needs. CM met with patient. Pt is independent with all care. No needs identified for discharge intervention at this time. MD and bedside RN  if needs arise please consult case management for discharge intervention. CM not following at this time.

## 2023-07-07 NOTE — PROGRESS NOTES
Inpatient Occupational Therapy Evaluation and Treatment    Unit: PCU  Date:  7/7/2023  Patient Name:    Reji Estrada  Admitting diagnosis:  Numbness [R20.0]  Admit Date:  7/6/2023  Precautions/Restrictions/WB Status/ Lines/ Wounds/ Oxygen: Telemtry    Treatment Time:  642-191  Treatment Number:  1  Timed Code Treatment Minutes: 10 minutes  Total Treatment Minutes:  10  minutes    Patient Goals for Therapy: \"none stated \"          Discharge Recommendations: Home Independently  DME needs for discharge: Needs Met         Therapy recommendations for staff: Independent for ambulation with use of No AD within halls    History of Present Illness: per H&P   41 y.o. female with pmh of gestational diabetes, gestational hypertension and thrombocytopenia,  who presents with      Bilateral arm numbness L > R, chest pain, perioral numbness that happened while at work around 1400. She reports symptoms started while she was sitting / rest (but it wasn't bad) - she got up to walk outside for her job, felt out. She sat back down and then her arms started tingling. She reports pain in her left sided chest, followed by her left arm becoming tingling, then her right arm started tingling. She also reports her legs started tingling. She reports this last about 45 minutes. She characterizes the left sided chest pain as \"poking\" or \"sharp. \"   She reports she was able to breath but it was difficult to take a deep breath. She reports pain would resolve for about 2 minutes but then it would come back. Sometimes it was hard to take a breath due to the pain - pain was worse with inspiration. Chest pain was only on left. She reports associated dyspnea. She reports feeling calm when this happened, no anxiety. Coworkers told her  that at work she had speech changes- was mumbling. But Goldie Fairbanks didn't notice a problem with her speech. Her coworker also commented on noticing facial asymmetry.       The coworker that noticed Tested    Bathing:    UE:  Not Tested  LE:  Not Tested    Eating:   Independent    Toileting:  Not Tested    Grooming/hygiene: Not Tested    Activity Tolerance:   Pt completed therapy session with no adverse symptoms     BP (mmHg) HR (bpm) SpO2 (%) on room air  Comments   Supine at rest       Seated at EOB       Standing       End of session         Positioning Needs:   Pt in bed, no alarm needed, call light provided and all needs within reach . Ther Ex / Activities Initiated:   N/A    Patient/Family Education:   Pt educated on role of inpatient OT, plan of care, importance of continued activity  Assessment:    Pt seen for Occupational therapy evaluation in acute care setting.   Allayne Decree) :   Pt IND no OT goals   Plan: D/C OT no needs     Electronically signed by Marilou Viera OT on 7/7/2023 at 7:35 AM      If patient discharges from this facility prior to next visit, this note will serve as the Discharge Summary

## 2023-07-07 NOTE — PROGRESS NOTES
Hospitalist updated on patient need for betablocker as patient's HR too elevated for CTA. Also updated on patient's bp in 63I systolic. Okayed for low dose metoprolol  -  adminstered to patient as ordered.

## 2023-07-07 NOTE — PLAN OF CARE
Problem: Discharge Planning  Goal: Discharge to home or other facility with appropriate resources  Outcome: Progressing  Flowsheets (Taken 7/6/2023 2045)  Discharge to home or other facility with appropriate resources: Identify barriers to discharge with patient and caregiver

## 2023-07-08 VITALS
BODY MASS INDEX: 25.21 KG/M2 | SYSTOLIC BLOOD PRESSURE: 116 MMHG | HEART RATE: 54 BPM | OXYGEN SATURATION: 96 % | DIASTOLIC BLOOD PRESSURE: 68 MMHG | WEIGHT: 142.3 LBS | TEMPERATURE: 97.7 F | RESPIRATION RATE: 16 BRPM | HEIGHT: 63 IN

## 2023-07-08 LAB
CRP SERPL-MCNC: <3 MG/L (ref 0–5.1)
ERYTHROCYTE [SEDIMENTATION RATE] IN BLOOD BY WESTERGREN METHOD: 11 MM/HR (ref 0–20)
EST. AVERAGE GLUCOSE BLD GHB EST-MCNC: 122.6 MG/DL
FERRITIN SERPL IA-MCNC: 19.9 NG/ML (ref 15–150)
HBA1C MFR BLD: 5.9 %
IRON SATN MFR SERPL: 44 % (ref 15–50)
IRON SERPL-MCNC: 145 UG/DL (ref 37–145)
T4 FREE SERPL-MCNC: 1.1 NG/DL (ref 0.9–1.8)
TIBC SERPL-MCNC: 330 UG/DL (ref 260–445)

## 2023-07-08 PROCEDURE — 86140 C-REACTIVE PROTEIN: CPT

## 2023-07-08 PROCEDURE — 99238 HOSP IP/OBS DSCHRG MGMT 30/<: CPT | Performed by: INTERNAL MEDICINE

## 2023-07-08 PROCEDURE — 2580000003 HC RX 258: Performed by: INTERNAL MEDICINE

## 2023-07-08 PROCEDURE — 85652 RBC SED RATE AUTOMATED: CPT

## 2023-07-08 PROCEDURE — 6370000000 HC RX 637 (ALT 250 FOR IP): Performed by: PEDIATRICS

## 2023-07-08 PROCEDURE — 36415 COLL VENOUS BLD VENIPUNCTURE: CPT

## 2023-07-08 PROCEDURE — 99233 SBSQ HOSP IP/OBS HIGH 50: CPT | Performed by: INTERNAL MEDICINE

## 2023-07-08 PROCEDURE — 6360000002 HC RX W HCPCS: Performed by: PEDIATRICS

## 2023-07-08 PROCEDURE — 6370000000 HC RX 637 (ALT 250 FOR IP): Performed by: INTERNAL MEDICINE

## 2023-07-08 RX ORDER — ASPIRIN 81 MG/1
81 TABLET ORAL DAILY
Qty: 30 TABLET | Refills: 3
Start: 2023-07-08

## 2023-07-08 RX ORDER — LISINOPRIL 5 MG/1
2.5 TABLET ORAL DAILY
Status: DISCONTINUED | OUTPATIENT
Start: 2023-07-08 | End: 2023-07-08 | Stop reason: HOSPADM

## 2023-07-08 RX ORDER — LISINOPRIL 2.5 MG/1
2.5 TABLET ORAL DAILY
Qty: 30 TABLET | Refills: 3 | Status: SHIPPED | OUTPATIENT
Start: 2023-07-08 | End: 2023-07-19

## 2023-07-08 RX ADMIN — LISINOPRIL 2.5 MG: 5 TABLET ORAL at 11:35

## 2023-07-08 RX ADMIN — SODIUM CHLORIDE: 9 INJECTION, SOLUTION INTRAVENOUS at 02:22

## 2023-07-08 RX ADMIN — ASPIRIN 81 MG: 81 TABLET, COATED ORAL at 11:39

## 2023-07-08 RX ADMIN — EMPAGLIFLOZIN 10 MG: 10 TABLET, FILM COATED ORAL at 11:38

## 2023-07-08 RX ADMIN — ENOXAPARIN SODIUM 40 MG: 100 INJECTION SUBCUTANEOUS at 11:35

## 2023-07-08 NOTE — PROGRESS NOTES
End of shift report given to [address] [place] [address] [address]  -  care transferred  -  patient resting in bed, IVF infusing at 100ml/hr. Denying complaints of chest pain or discomfort. Call light in patient's reach.

## 2023-07-08 NOTE — PROGRESS NOTES
Patient educated on discharge instructions as well as new medications use, dosage, administration and possible side effects. Patient verified knowledge. IV removed without difficulty and dry dressing in place. Telemetry monitor removed and returned to [facility]. Pt left facility in stable condition to home with all of their personal belongings.

## 2023-07-08 NOTE — DISCHARGE SUMMARY
Name:  Colt Butler  Room:  /5749-78  MRN:    [id number]    IM Discharge Summary    Discharging Physician:  Snehal Talamantes MD    Admit: 7/6/2023    Discharge:      PCP      Kianna Riddle MD    Diagnoses and hospital course  this Admission      #Stroke-like symptoms  -with extremity and perioral numbness  -CT head and CTA C/A/P unremarkable  - symptoms resolved before arrival. Non focal exam  - likely non specific symptoms        #Chest pain- chronic LBBB  New cardiomyopathy -with low EF - unspecified type  -CXR unremarkable     -EKG non-acute, demonstrates old LBBB  -serial troponin's negative   -ECHO with new low EF of 35 % and several wall motion abn   - CTA cardiac with no evidence of significant CAD   - start asa,  low dose BB caused bradycardia and stopped. - initiating lisinopril at 2.5 mg and jardiance for now and will need outpt f/w   - -cardiology consulted,            #Tobacco use  -counseled cessation  -declines prn nicotine replacement          HPI 41 y.o. female with pmh of gestational diabetes, gestational hypertension and thrombocytopenia,  who presents with      Bilateral arm numbness L > R, chest pain, perioral numbness that happened while at work around 1400. She reports symptoms started while she was sitting / rest (but it wasn't bad) - she got up to walk outside for her job, felt out. She sat back down and then her arms started tingling. She reports pain in her left sided chest, followed by her left arm becoming tingling, then her right arm started tingling. She also reports her legs started tingling. She reports this last about 45 minutes. She characterizes the left sided chest pain as \"poking\" or \"sharp. \"   She reports she was able to breath but it was difficult to take a deep breath. She reports pain would resolve for about 2 minutes but then it would come back. Sometimes it was hard to take a breath due to the pain - pain was worse with inspiration.  Chest pain was only on

## 2023-07-08 NOTE — DISCHARGE INSTRUCTIONS
Low salt diet  No smoking  F/w DR. PITTS in 1-2 weeks  May return to work from 7/17/23  Repeat ECHO needed in 3 months to ensure improvement in Heart function

## 2023-07-10 ENCOUNTER — TELEPHONE (OUTPATIENT)
Dept: CARDIOLOGY CLINIC | Age: 43
End: 2023-07-10

## 2023-07-10 NOTE — TELEPHONE ENCOUNTER
Try to reach patient. I made her a hospital follow up with LEONARD NP at [facility] on 8/11 at 8:30 am.  She has no VM and did not answer phone.

## 2023-07-18 ENCOUNTER — TELEPHONE (OUTPATIENT)
Dept: CARDIOLOGY CLINIC | Age: 43
End: 2023-07-18

## 2023-07-18 ENCOUNTER — OFFICE VISIT (OUTPATIENT)
Dept: CARDIOLOGY CLINIC | Age: 43
End: 2023-07-18
Payer: COMMERCIAL

## 2023-07-18 VITALS
OXYGEN SATURATION: 98 % | WEIGHT: 141.8 LBS | SYSTOLIC BLOOD PRESSURE: 82 MMHG | BODY MASS INDEX: 25.12 KG/M2 | HEART RATE: 82 BPM | HEIGHT: 63 IN | DIASTOLIC BLOOD PRESSURE: 64 MMHG

## 2023-07-18 DIAGNOSIS — I50.20 HFREF (HEART FAILURE WITH REDUCED EJECTION FRACTION) (HCC): ICD-10-CM

## 2023-07-18 DIAGNOSIS — I42.8 NONISCHEMIC CARDIOMYOPATHY (HCC): ICD-10-CM

## 2023-07-18 DIAGNOSIS — I44.7 LBBB (LEFT BUNDLE BRANCH BLOCK): Primary | ICD-10-CM

## 2023-07-18 PROCEDURE — 99215 OFFICE O/P EST HI 40 MIN: CPT | Performed by: NURSE PRACTITIONER

## 2023-07-18 PROCEDURE — 93000 ELECTROCARDIOGRAM COMPLETE: CPT | Performed by: NURSE PRACTITIONER

## 2023-07-18 NOTE — PATIENT INSTRUCTIONS
Hold jardiance and lisinopril for a few days; if BP >95, can resume lisinopril  Check BP at home and call the office if consistently out of goal range  Cardiac MRI, call [phone]  Follow up with [facility] as planned

## 2023-07-19 ASSESSMENT — ENCOUNTER SYMPTOMS: SHORTNESS OF BREATH: 1

## 2023-07-19 NOTE — TELEPHONE ENCOUNTER
Thanks for working on appointment with Dr. Giselle Portillo. Can staff please call to see if cardiac MRI can be arranged prior to 8/19/2023 (patient will be out of town the following week and unable to proceed with 8/25/2023 date). Thank you!

## 2023-07-25 ENCOUNTER — TELEPHONE (OUTPATIENT)
Dept: CARDIOLOGY CLINIC | Age: 43
End: 2023-07-25

## 2023-07-25 NOTE — TELEPHONE ENCOUNTER
Attempted to reach pt and unable to leave a VM. I was trying to get more info. She was seen by [facility] 7/18/23 and bp was 82/64 in office.

## 2023-07-26 NOTE — TELEPHONE ENCOUNTER
The increase in pulse can be expected given exertion and hx cardiomyopathy. Follow clinically. OK to return to work but would avoid heavy lifting > 15-20#. If she has to strain then don't lift.

## 2023-07-26 NOTE — TELEPHONE ENCOUNTER
Yesterday, she had been cleaning out her car and vacuuming it and her pulse went up to 117 an BP was 92/74. She notes that her heart felt like it was going to jump out of her chest she wants to know if this is okay. 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

## 2023-08-08 ENCOUNTER — HOSPITAL ENCOUNTER (OUTPATIENT)
Dept: MRI IMAGING | Age: 43
Discharge: HOME | End: 2023-08-08

## 2023-08-08 DIAGNOSIS — I42.8 NONISCHEMIC CARDIOMYOPATHY (HCC): ICD-10-CM

## 2023-08-08 DIAGNOSIS — I44.7 LBBB (LEFT BUNDLE BRANCH BLOCK): ICD-10-CM

## 2023-08-08 LAB
LV EF: 51 %
LVEF MODALITY: NORMAL

## 2023-08-08 PROCEDURE — 75565 CARD MRI VELOC FLOW MAPPING: CPT

## 2023-08-08 PROCEDURE — 75565 CARD MRI VELOC FLOW MAPPING: CPT | Performed by: INTERNAL MEDICINE

## 2023-08-08 PROCEDURE — 75561 CARDIAC MRI FOR MORPH W/DYE: CPT | Performed by: INTERNAL MEDICINE

## 2023-08-08 PROCEDURE — 6360000004 HC RX CONTRAST MEDICATION: Performed by: NURSE PRACTITIONER

## 2023-08-08 PROCEDURE — A9585 GADOBUTROL INJECTION: HCPCS | Performed by: NURSE PRACTITIONER

## 2023-08-08 RX ADMIN — GADOBUTROL 12 ML: 604.72 INJECTION INTRAVENOUS at 14:20

## 2023-08-10 ENCOUNTER — TELEPHONE (OUTPATIENT)
Dept: CARDIOLOGY CLINIC | Age: 43
End: 2023-08-10

## 2023-08-10 NOTE — TELEPHONE ENCOUNTER
----- Message from ULICES Logan CNP sent at 8/9/2023  3:04 PM EDT -----  Called patient to review results. No answer, voicemail full and unable to leave message. Please try to call again 8/10, let her know that cardiac MRI shows improvement in heart function which is great news. [facility]  Thank you

## 2023-08-14 NOTE — PROGRESS NOTES
[facility]  Advanced CHF/Pulmonary Hypertension   Cardiac Evaluation      Emerson Lau  YOB: 1980    Date of Visit:  8/15/23      Chief Complaint   Patient presents with    New Patient    Chest Pain    Shortness of Breath    Fatigue        History of Present Illness:  Emerson Lau is a 41 y.o. female who presents from referral from [facility] Gaspar for consultation and management of cardiomyopathy. She has a history of thrombocytopenia. She was admitted 7/6/2023 for chest pain. EKG showed SR LBBB   Echo 7/7/2023 EF 35-40%. Cardiac MRI 8/8/2023 EF 51%, Reduced LV global longitudinal strain (GLS) -15%. Today, 8/15/2023, She reports she feels better compared to a few weeks ago. She reports she still feels fatigued. She reports sharp/stabbing pain in her left upper chest and under her left breast. She is taking entresto 24-26 half tablet in the morning. She reports when she was taking jardiance she felt weak and had a very low BP. She denies chest pain when taking a deep breath. She reports dizziness. 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 Patient is taking all cardiac medications as prescribed and tolerates them well. She reports her mother has a fast heart rate and hypertension. Her aunt has an irregular heart rate. She recently quit smoking. Patient denies current edema, palpitations, or syncope. NYHA Class:  2      No Known Allergies  Current Outpatient Medications   Medication Sig Dispense Refill    sacubitril-valsartan (ENTRESTO) 24-26 MG per tablet Take 0.5 tablets by mouth 2 times daily (Patient taking differently: Take 0.5 tablets by mouth daily) 60 tablet 3    aspirin 81 MG EC tablet Take 1 tablet by mouth daily 30 tablet 3    empagliflozin (JARDIANCE) 10 MG tablet Take 1 tablet by mouth daily (Patient not taking: Reported on 8/15/2023) 30 tablet 3     No current facility-administered medications for this visit.        Past Medical History:   Diagnosis Date

## 2023-08-15 ENCOUNTER — OFFICE VISIT (OUTPATIENT)
Dept: CARDIOLOGY CLINIC | Age: 43
End: 2023-08-15

## 2023-08-15 VITALS
OXYGEN SATURATION: 97 % | SYSTOLIC BLOOD PRESSURE: 92 MMHG | BODY MASS INDEX: 25.6 KG/M2 | HEIGHT: 63 IN | WEIGHT: 144.5 LBS | DIASTOLIC BLOOD PRESSURE: 54 MMHG | HEART RATE: 87 BPM

## 2023-08-15 DIAGNOSIS — I50.22 SYSTOLIC CHF, CHRONIC (HCC): Primary | ICD-10-CM

## 2023-08-15 DIAGNOSIS — I44.7 LBBB (LEFT BUNDLE BRANCH BLOCK): ICD-10-CM

## 2023-08-15 DIAGNOSIS — I42.8 NONISCHEMIC CARDIOMYOPATHY (HCC): ICD-10-CM

## 2023-08-15 RX ORDER — METOPROLOL SUCCINATE 25 MG/1
12.5 TABLET, EXTENDED RELEASE ORAL DAILY
Qty: 45 TABLET | Refills: 3 | Status: SHIPPED | OUTPATIENT
Start: 2023-08-15

## 2023-08-15 NOTE — PATIENT INSTRUCTIONS
Plan:  Change entresto 24-26 half tablet in the evening  Start metoprolol succinate (toprol xl) 12.5 mg in the evening  Labs in 1 months: BMP, BNP  Follow up with [facility]an in [facility] in 6-8 weeks

## 2023-09-20 ENCOUNTER — TELEPHONE (OUTPATIENT)
Dept: CARDIOLOGY CLINIC | Age: 43
End: 2023-09-20

## 2023-09-20 NOTE — TELEPHONE ENCOUNTER
Pt states she needs a refill of Entresto but it is too expensive. Pt would like to know if there is another medication she can try or if patient assistance would be available. Please advise.

## 2023-09-20 NOTE — TELEPHONE ENCOUNTER
Called and spoke with pt. She is on her last day of entresto medication. Information given to pt to apply for financial assistance or $10 discount care.   Pt will call [facility] office and see if they have any samples she could  today or tomorrow for the next month while she is waiting on pt assistance approval.

## 2023-09-20 NOTE — TELEPHONE ENCOUNTER
Pt called [facility] and informed me she would like to  patient assistance forms for her Entresto. Pt asked for samples as well since today is her last dose of the medication. Entresto patient assistance forms are ready for the pt to , as well are the samples. Pt informed me she would be sending her  to pick them up.

## 2023-10-13 ENCOUNTER — HOSPITAL ENCOUNTER (OUTPATIENT)
Age: 43
Discharge: HOME | End: 2023-10-13
Payer: COMMERCIAL

## 2023-10-13 DIAGNOSIS — I50.22 SYSTOLIC CHF, CHRONIC (HCC): ICD-10-CM

## 2023-10-13 DIAGNOSIS — I42.8 NONISCHEMIC CARDIOMYOPATHY (HCC): ICD-10-CM

## 2023-10-13 LAB
ANION GAP SERPL CALCULATED.3IONS-SCNC: 12 MMOL/L (ref 3–16)
BUN SERPL-MCNC: 15 MG/DL (ref 7–20)
CALCIUM SERPL-MCNC: 8.9 MG/DL (ref 8.3–10.6)
CHLORIDE SERPL-SCNC: 102 MMOL/L (ref 99–110)
CO2 SERPL-SCNC: 25 MMOL/L (ref 21–32)
CREAT SERPL-MCNC: 0.7 MG/DL (ref 0.6–1.1)
GFR SERPLBLD CREATININE-BSD FMLA CKD-EPI: >60 ML/MIN/{1.73_M2}
GLUCOSE SERPL-MCNC: 106 MG/DL (ref 70–99)
NT-PROBNP SERPL-MCNC: 141 PG/ML (ref 0–124)
POTASSIUM SERPL-SCNC: 4.3 MMOL/L (ref 3.5–5.1)
SODIUM SERPL-SCNC: 139 MMOL/L (ref 136–145)

## 2023-10-13 PROCEDURE — 83880 ASSAY OF NATRIURETIC PEPTIDE: CPT

## 2023-10-13 PROCEDURE — 80048 BASIC METABOLIC PNL TOTAL CA: CPT

## 2023-10-13 PROCEDURE — 36415 COLL VENOUS BLD VENIPUNCTURE: CPT

## 2023-10-17 ENCOUNTER — TELEPHONE (OUTPATIENT)
Dept: CARDIOLOGY CLINIC | Age: 43
End: 2023-10-17

## 2023-10-27 ENCOUNTER — OFFICE VISIT (OUTPATIENT)
Dept: CARDIOLOGY CLINIC | Age: 43
End: 2023-10-27
Payer: COMMERCIAL

## 2023-10-27 VITALS
OXYGEN SATURATION: 100 % | HEART RATE: 68 BPM | BODY MASS INDEX: 27.29 KG/M2 | WEIGHT: 154 LBS | DIASTOLIC BLOOD PRESSURE: 52 MMHG | SYSTOLIC BLOOD PRESSURE: 92 MMHG | HEIGHT: 63 IN

## 2023-10-27 DIAGNOSIS — I42.8 NONISCHEMIC CARDIOMYOPATHY (HCC): Primary | ICD-10-CM

## 2023-10-27 DIAGNOSIS — I44.7 LBBB (LEFT BUNDLE BRANCH BLOCK): ICD-10-CM

## 2023-10-27 DIAGNOSIS — I50.20 HEART FAILURE WITH REDUCED EJECTION FRACTION (HCC): ICD-10-CM

## 2023-10-27 PROCEDURE — G8419 CALC BMI OUT NRM PARAM NOF/U: HCPCS | Performed by: NURSE PRACTITIONER

## 2023-10-27 PROCEDURE — 1036F TOBACCO NON-USER: CPT | Performed by: NURSE PRACTITIONER

## 2023-10-27 PROCEDURE — G8427 DOCREV CUR MEDS BY ELIG CLIN: HCPCS | Performed by: NURSE PRACTITIONER

## 2023-10-27 PROCEDURE — 99214 OFFICE O/P EST MOD 30 MIN: CPT | Performed by: NURSE PRACTITIONER

## 2023-10-27 PROCEDURE — G8484 FLU IMMUNIZE NO ADMIN: HCPCS | Performed by: NURSE PRACTITIONER

## 2023-10-27 RX ORDER — METOPROLOL SUCCINATE 25 MG/1
12.5 TABLET, EXTENDED RELEASE ORAL DAILY
Qty: 45 TABLET | Refills: 3 | Status: SHIPPED | OUTPATIENT
Start: 2023-10-27

## 2023-10-27 NOTE — PROGRESS NOTES
[facility]  Heart Failure Follow-up    Primary Care Doctor:  No primary care provider on file. Chief Complaint   Patient presents with    Follow-up    Cardiomyopathy        History of Present Illness:  I had the pleasure of seeing Jaquelin Monreal as a new patient to me for follow up for cardiomyopathy. Seen by Dr. Tommie Peterson last visit 8/2023 started on toprol, low dose, entresto changed to evening. She has some chest pain randomly sharp 2-3 seconds. No associated symptoms except for the pain in the left shoulder blade and in the left arm- thinking more nerve related.   + dizziness with position changes. She has a bad cold last year for 3-4 weeks in 2022. But otherwise no other viruses/illnesses. Tried to go on 5 mile hike last week. Did okay mostly but the last 1 mile was uphill and she had shortness of breath. Did well afterwards, no fatigue after the hike. Jaquelin Monreal describes symptoms including dyspnea but denies palpitations, early saiety, edema. Past Medical History:   has a past medical history of Dizziness, Gestational diabetes, and Hypertension. Surgical History:   has a past surgical history that includes Appendectomy (2002) and other surgical history (Left, 8/21/15). Social History:   reports that she has quit smoking. Her smoking use included cigarettes. She smoked an average of .5 packs per day. She has never used smokeless tobacco. She reports current alcohol use. She reports that she does not use drugs. Family History:   No family history on file. Home Medications:  Prior to Admission medications    Medication Sig Start Date End Date Taking?  Authorizing Provider   metoprolol succinate (TOPROL XL) 25 MG extended release tablet Take 0.5 tablets by mouth daily 8/15/23   Tevin Parmar MD   sacubitril-valsartan (ENTRESTO) 24-26 MG per tablet Take 0.5 tablets by mouth 2 times daily  Patient taking differently: Take 0.5 tablets by mouth daily 7/19/23   ULICES Oakes

## 2023-10-27 NOTE — PATIENT INSTRUCTIONS
Plan:   Continue Entresto -current dose 1/2 tab daily   Continue Toprol 1/2 tab daily   Avoid over the counter cold medications that contain pseudo-ephredrines, phenylephrines, neosynephrines = decongestants  Okay to take antihistamines without the decongestant (Claritin, Allegra, Zyrtec, Benadryl without the \"D\")  Okay to take guaifenesin (Mucinex or Robitussion) to help clear phlegm, okay to take dextromethorphan (Delsym) as cough suppressant. Okay to take tylenol or aspirin for pain  Avoid anti-inflammatory agents including ibuprofen, Motrin, Advil, Aleve, Naprosyn or Naproxen as these can interact with your kidneys and heart medications.   Avoid sodium based antacids (Nelia-Campobello)  Avoid natural supplements containing sodium (Airborne)  Follow up 2 months

## 2023-12-15 ENCOUNTER — TELEPHONE (OUTPATIENT)
Dept: CARDIOLOGY CLINIC | Age: 43
End: 2023-12-15

## 2023-12-15 NOTE — TELEPHONE ENCOUNTER
Patient called and was wanting an extra RX for Gamal Samples because she is going to [facility] until January 15 th. Patient also states she can not afford the Entresto due to her  losing his job. Samples and patient assistance forms provided to the patient.

## 2024-01-12 ENCOUNTER — HOSPITAL ENCOUNTER (OUTPATIENT)
Age: 44
Discharge: HOME | End: 2024-01-12
Payer: COMMERCIAL

## 2024-01-12 ENCOUNTER — OFFICE VISIT (OUTPATIENT)
Dept: CARDIOLOGY CLINIC | Age: 44
End: 2024-01-12
Payer: COMMERCIAL

## 2024-01-12 ENCOUNTER — TELEPHONE (OUTPATIENT)
Dept: CARDIOLOGY CLINIC | Age: 44
End: 2024-01-12

## 2024-01-12 ENCOUNTER — HOSPITAL ENCOUNTER (OUTPATIENT)
Dept: GENERAL RADIOLOGY | Age: 44
Discharge: HOME | End: 2024-01-12
Payer: COMMERCIAL

## 2024-01-12 VITALS
HEART RATE: 82 BPM | DIASTOLIC BLOOD PRESSURE: 60 MMHG | SYSTOLIC BLOOD PRESSURE: 88 MMHG | OXYGEN SATURATION: 97 % | HEIGHT: 63 IN | BODY MASS INDEX: 28.24 KG/M2 | WEIGHT: 159.4 LBS

## 2024-01-12 DIAGNOSIS — I42.8 NONISCHEMIC CARDIOMYOPATHY (HCC): ICD-10-CM

## 2024-01-12 DIAGNOSIS — I50.20 HEART FAILURE WITH REDUCED EJECTION FRACTION (HCC): ICD-10-CM

## 2024-01-12 DIAGNOSIS — R07.81 PLEURITIC CHEST PAIN: ICD-10-CM

## 2024-01-12 DIAGNOSIS — I42.8 NONISCHEMIC CARDIOMYOPATHY (HCC): Primary | ICD-10-CM

## 2024-01-12 LAB
ANION GAP SERPL CALCULATED.3IONS-SCNC: 11 MMOL/L (ref 3–16)
BUN SERPL-MCNC: 9 MG/DL (ref 7–20)
CALCIUM SERPL-MCNC: 9.1 MG/DL (ref 8.3–10.6)
CHLORIDE SERPL-SCNC: 101 MMOL/L (ref 99–110)
CO2 SERPL-SCNC: 26 MMOL/L (ref 21–32)
CREAT SERPL-MCNC: 0.7 MG/DL (ref 0.6–1.1)
GFR SERPLBLD CREATININE-BSD FMLA CKD-EPI: >60 ML/MIN/{1.73_M2}
GLUCOSE SERPL-MCNC: 111 MG/DL (ref 70–99)
MAGNESIUM SERPL-MCNC: 2.2 MG/DL (ref 1.8–2.4)
NT-PROBNP SERPL-MCNC: <36 PG/ML (ref 0–124)
POTASSIUM SERPL-SCNC: 4.2 MMOL/L (ref 3.5–5.1)
SODIUM SERPL-SCNC: 138 MMOL/L (ref 136–145)

## 2024-01-12 PROCEDURE — 83735 ASSAY OF MAGNESIUM: CPT

## 2024-01-12 PROCEDURE — 71046 X-RAY EXAM CHEST 2 VIEWS: CPT

## 2024-01-12 PROCEDURE — G8419 CALC BMI OUT NRM PARAM NOF/U: HCPCS | Performed by: NURSE PRACTITIONER

## 2024-01-12 PROCEDURE — 83880 ASSAY OF NATRIURETIC PEPTIDE: CPT

## 2024-01-12 PROCEDURE — 36415 COLL VENOUS BLD VENIPUNCTURE: CPT

## 2024-01-12 PROCEDURE — 99214 OFFICE O/P EST MOD 30 MIN: CPT | Performed by: NURSE PRACTITIONER

## 2024-01-12 PROCEDURE — 1036F TOBACCO NON-USER: CPT | Performed by: NURSE PRACTITIONER

## 2024-01-12 PROCEDURE — G8427 DOCREV CUR MEDS BY ELIG CLIN: HCPCS | Performed by: NURSE PRACTITIONER

## 2024-01-12 PROCEDURE — 80048 BASIC METABOLIC PNL TOTAL CA: CPT

## 2024-01-12 PROCEDURE — G8484 FLU IMMUNIZE NO ADMIN: HCPCS | Performed by: NURSE PRACTITIONER

## 2024-01-12 NOTE — PROGRESS NOTES
07/06/2023 13.4 12.0 - 16.0 g/dL Final   07/24/2021 14.2 12.0 - 16.0 g/dL Final     Hematocrit   Date Value Ref Range Status   07/07/2023 39.1 36.0 - 48.0 % Final   07/06/2023 39.1 36.0 - 48.0 % Final   07/24/2021 40.4 36.0 - 48.0 % Final     MCV   Date Value Ref Range Status   07/07/2023 89.9 80.0 - 100.0 fL Final   07/06/2023 90.2 80.0 - 100.0 fL Final   07/24/2021 90.6 80.0 - 100.0 fL Final     RDW   Date Value Ref Range Status   07/07/2023 13.2 12.4 - 15.4 % Final   07/06/2023 13.2 12.4 - 15.4 % Final   07/24/2021 12.8 12.4 - 15.4 % Final     Platelets   Date Value Ref Range Status   07/07/2023 162 135 - 450 K/uL Final   07/06/2023 166 135 - 450 K/uL Final   07/24/2021 198 135 - 450 K/uL Final     Iron:    Iron   Date Value Ref Range Status   07/07/2023 145 37 - 145 ug/dL Final     TIBC   Date Value Ref Range Status   07/07/2023 330 260 - 445 ug/dL Final     Ferritin   Date Value Ref Range Status   07/07/2023 19.9 15.0 - 150.0 ng/mL Final     BMP:   Sodium   Date Value Ref Range Status   10/13/2023 139 136 - 145 mmol/L Final   07/06/2023 138 136 - 145 mmol/L Final   07/24/2021 138 136 - 145 mmol/L Final     Potassium   Date Value Ref Range Status   10/13/2023 4.3 3.5 - 5.1 mmol/L Final   07/24/2021 4.4 3.5 - 5.1 mmol/L Final     Potassium reflex Magnesium   Date Value Ref Range Status   07/06/2023 3.8 3.5 - 5.1 mmol/L Final     Chloride   Date Value Ref Range Status   10/13/2023 102 99 - 110 mmol/L Final   07/06/2023 103 99 - 110 mmol/L Final   07/24/2021 101 99 - 110 mmol/L Final     CO2   Date Value Ref Range Status   10/13/2023 25 21 - 32 mmol/L Final   07/06/2023 21 21 - 32 mmol/L Final   07/24/2021 27 21 - 32 mmol/L Final     BUN   Date Value Ref Range Status   10/13/2023 15 7 - 20 mg/dL Final   07/06/2023 16 7 - 20 mg/dL Final   07/24/2021 14 7 - 20 mg/dL Final     Creatinine   Date Value Ref Range Status   10/13/2023 0.7 0.6 - 1.1 mg/dL Final   07/06/2023 0.8 0.6 - 1.1 mg/dL Final   07/24/2021 0.6 0.6 -

## 2024-01-12 NOTE — PATIENT INSTRUCTIONS
Plan:   Continue Entresto -current dose 1/2 tab daily - b/p limits on increasing med  Continue Toprol 1/2 tab daily - b/p and fatigue limits increasing med   CXR, check labs   Follow up 3 months

## 2024-01-12 NOTE — TELEPHONE ENCOUNTER
----- Message from ULICES Montoya - CNP sent at 1/12/2024  1:45 PM EST -----  Reviewed, please call patient.  Her lung appeared to be clear on chest x-ray film.  No evidence of any pneumonia  Please encourage her to take some deep breaths, which helps open up the airspaces and increase activity.

## 2024-01-12 NOTE — RESULT ENCOUNTER NOTE
Reviewed, please call patient.  Her lung appeared to be clear on chest x-ray film.  No evidence of any pneumonia  Please encourage her to take some deep breaths, which helps open up the airspaces and increase activity.

## 2024-01-15 ENCOUNTER — TELEPHONE (OUTPATIENT)
Dept: CARDIOLOGY CLINIC | Age: 44
End: 2024-01-15

## 2024-01-15 NOTE — TELEPHONE ENCOUNTER
Created telephone encounter. Spoke with Riley relayed message per NPRB regarding labs. Pt verbalized understanding.

## 2024-01-15 NOTE — TELEPHONE ENCOUNTER
----- Message from ULICES Montoya - CNP sent at 1/15/2024  8:44 AM EST -----  Please notify patient results. Labs look great! Kidney function is stable. Electrolytes are stable.   Pro-BNP (heart failure number) is significantly improved and normal!!  Continue current regimen.

## 2024-03-15 ENCOUNTER — TELEPHONE (OUTPATIENT)
Dept: CARDIOLOGY CLINIC | Age: 44
End: 2024-03-15

## 2024-03-15 DIAGNOSIS — I42.9 CARDIOMYOPATHY, UNSPECIFIED TYPE (HCC): Primary | ICD-10-CM

## 2024-03-15 NOTE — TELEPHONE ENCOUNTER
Spoke with pt, she is not interested in pt assistance program. She would like to know if there is something less expensive for her to take?

## 2024-03-15 NOTE — TELEPHONE ENCOUNTER
Pt states she went to get a refill of Entresto but it is too expensive and her insurance will not cover it. Pt would like to know if there is another medication she can take. Please advise.

## 2024-03-15 NOTE — TELEPHONE ENCOUNTER
Submitted PA for ENTRESTO  [facility] Key: [id number]  STATUS: PENDING.    Follow up done daily; if no decision with in three days we will refax.  If another three days goes by with no decision will call the insurance for status.

## 2024-03-15 NOTE — TELEPHONE ENCOUNTER
Did she get the copay card? She should be able to use that.   If still too expensive after the copay card then can change to valsartan

## 2024-03-18 NOTE — TELEPHONE ENCOUNTER
Pt returned call. She was approved for the PA for Entresto. Pt would like to see what that cost would be and needs refills called into [facility] [phone]. She has been out of the Entresto for 3 days.

## 2024-03-18 NOTE — TELEPHONE ENCOUNTER
Rx pending sing off for review. Pt says she is taking entresto 24-26 half tablet once daily  LOV NPRB 1/12/2024

## 2024-04-19 ENCOUNTER — OFFICE VISIT (OUTPATIENT)
Dept: CARDIOLOGY CLINIC | Age: 44
End: 2024-04-19
Payer: COMMERCIAL

## 2024-04-19 VITALS
OXYGEN SATURATION: 98 % | DIASTOLIC BLOOD PRESSURE: 60 MMHG | HEART RATE: 59 BPM | BODY MASS INDEX: 27.64 KG/M2 | WEIGHT: 156 LBS | SYSTOLIC BLOOD PRESSURE: 92 MMHG | HEIGHT: 63 IN

## 2024-04-19 DIAGNOSIS — I50.20 HEART FAILURE WITH REDUCED EJECTION FRACTION (HCC): ICD-10-CM

## 2024-04-19 DIAGNOSIS — R07.9 CHEST PAIN, UNSPECIFIED TYPE: Primary | ICD-10-CM

## 2024-04-19 DIAGNOSIS — I42.9 CARDIOMYOPATHY, UNSPECIFIED TYPE (HCC): ICD-10-CM

## 2024-04-19 DIAGNOSIS — Z72.0 TOBACCO ABUSE: ICD-10-CM

## 2024-04-19 PROCEDURE — 99214 OFFICE O/P EST MOD 30 MIN: CPT | Performed by: NURSE PRACTITIONER

## 2024-04-19 PROCEDURE — G8427 DOCREV CUR MEDS BY ELIG CLIN: HCPCS | Performed by: NURSE PRACTITIONER

## 2024-04-19 PROCEDURE — 4004F PT TOBACCO SCREEN RCVD TLK: CPT | Performed by: NURSE PRACTITIONER

## 2024-04-19 PROCEDURE — G8419 CALC BMI OUT NRM PARAM NOF/U: HCPCS | Performed by: NURSE PRACTITIONER

## 2024-04-19 PROCEDURE — 93000 ELECTROCARDIOGRAM COMPLETE: CPT | Performed by: NURSE PRACTITIONER

## 2024-04-19 RX ORDER — VARENICLINE TARTRATE 0.5 (11)-1
KIT ORAL
Qty: 1 EACH | Refills: 0 | Status: SHIPPED | OUTPATIENT
Start: 2024-04-19

## 2024-04-19 NOTE — PROGRESS NOTES
[facility]  Heart Failure Follow-up    Primary Care Doctor:  No primary care provider on file.    Chief Complaint   Patient presents with    3 Month Follow-Up    Hypertension      History of Present Illness:  I had the pleasure of seeing Riley Woods as a follow up  patient for follow up for cardiomyopathy.     Since last visit, she was off of Entresto for 5 days.  She felt awful had a lot of chest pain pressures tightness across the chest.  After she took her medication she felt better the following day.  Cost of the Entresto is going to be too much.  Asking for alternatives.  She has restarted smoking.  She has tried Chantix in the past.  Which was successful.  She was also tried nicotine gum in the past    Riley Woods describes symptoms including chest pressure/discomfort, fatigue but denies palpitations, syncope.       Past Medical History:   has a past medical history of Dizziness, Gestational diabetes, Hypertension, Left bundle branch block, and Nonischemic cardiomyopathy (HCC).  Surgical History:   has a past surgical history that includes Appendectomy (2002) and other surgical history (Left, 8/21/15).   Social History:   reports that she has been smoking cigarettes. She has been exposed to tobacco smoke. She has never used smokeless tobacco. She reports current alcohol use. She reports that she does not use drugs.   Family History:   Family History   Problem Relation Age of Onset    Supraventricular tachycardia  Mother     Hypertension Father     No Known Problems Sister     Other Maternal Aunt         heart skips beat     Home Medications:  Prior to Admission medications    Medication Sig Start Date End Date Taking? Authorizing Provider   sacubitril-valsartan (ENTRESTO) 24-26 MG per tablet Take 0.5 tablets by mouth daily 3/18/24  Yes Soha Lorenzo, APRN - CNP   metoprolol succinate (TOPROL XL) 25 MG extended release tablet Take 0.5 tablets by mouth daily 10/27/23  Yes Soha Lorenzo,

## 2024-04-19 NOTE — PATIENT INSTRUCTIONS
Plan:   Continue Entresto -adjust to 1 tablet daily for now and will plan to try to titrate to 1 tab twice a day pending blood pressures and patient response/side effects  Patient assistance forms for Entresto today  Continue Toprol 1/2 tab daily  Discussed smoking cessation, will start with Chantix starter pack  Follow up 3 months

## 2024-07-23 DIAGNOSIS — I42.8 NONISCHEMIC CARDIOMYOPATHY (HCC): ICD-10-CM

## 2024-07-23 DIAGNOSIS — I50.20 HEART FAILURE WITH REDUCED EJECTION FRACTION (HCC): ICD-10-CM

## 2024-07-23 RX ORDER — METOPROLOL SUCCINATE 25 MG/1
TABLET, EXTENDED RELEASE ORAL
Qty: 45 TABLET | Refills: 0 | Status: SHIPPED | OUTPATIENT
Start: 2024-07-23

## 2024-07-23 NOTE — TELEPHONE ENCOUNTER
Pt needs F/U with HF team in July/August per [facility] LOV. Route back for refills. Thanks     PEDRO MIRANDA 8/15/23

## 2024-07-26 ENCOUNTER — OFFICE VISIT (OUTPATIENT)
Dept: CARDIOLOGY CLINIC | Age: 44
End: 2024-07-26

## 2024-07-26 VITALS
OXYGEN SATURATION: 97 % | SYSTOLIC BLOOD PRESSURE: 126 MMHG | HEART RATE: 72 BPM | DIASTOLIC BLOOD PRESSURE: 70 MMHG | WEIGHT: 151.5 LBS | BODY MASS INDEX: 26.84 KG/M2 | HEIGHT: 63 IN

## 2024-07-26 DIAGNOSIS — I50.20 HEART FAILURE WITH REDUCED EJECTION FRACTION (HCC): Primary | ICD-10-CM

## 2024-07-26 DIAGNOSIS — I42.9 CARDIOMYOPATHY, UNSPECIFIED TYPE (HCC): ICD-10-CM

## 2024-07-26 DIAGNOSIS — J20.9 ACUTE BRONCHITIS, UNSPECIFIED ORGANISM: ICD-10-CM

## 2024-07-26 DIAGNOSIS — R07.9 CHEST PAIN, UNSPECIFIED TYPE: ICD-10-CM

## 2024-07-26 RX ORDER — METHYLPREDNISOLONE 4 MG/1
TABLET ORAL
Qty: 1 KIT | Refills: 2 | Status: SHIPPED | OUTPATIENT
Start: 2024-07-26 | End: 2024-08-01

## 2024-07-26 RX ORDER — ALBUTEROL SULFATE 90 UG/1
2 AEROSOL, METERED RESPIRATORY (INHALATION) EVERY 6 HOURS PRN
Qty: 18 G | Refills: 0 | Status: SHIPPED | OUTPATIENT
Start: 2024-07-26

## 2024-07-26 NOTE — PROGRESS NOTES
[facility]  Heart Failure Follow-up    Primary Care Doctor:  No primary care provider on file.    Chief Complaint   Patient presents with    3 Month Follow-Up    Hypertension    Cardiomyopathy    Chest Pain     Discomfort       History of Present Illness:  I had the pleasure of seeing Riley Woods in follow up for cardiomyopathy.     Since last visit, She has had cough for last 2 weeks.  Thick productive sputum. Still smoking, trying to quit.  Harsh cough  Chest pain a few days ago, pressure like pain that didn't radiate and not associated with any other symptoms.   Cough is improving overall.   Currently on 24/26mg nightly.   No dizziness.  She had sharp left arm pain- with reaching forward; pain started in the shoulder and then down the arm to the fingertips. No other symptoms   She is not able to run for very far and then gets short of breath.   She had some shortness of breath with getting upset.   No BLE edema. Tolerating current medications.     Past Medical History:   has a past medical history of Dizziness, Gestational diabetes, Hypertension, Left bundle branch block, and Nonischemic cardiomyopathy (HCC).  Surgical History:   has a past surgical history that includes Appendectomy (2002) and other surgical history (Left, 8/21/15).   Social History:   reports that she has been smoking cigarettes. She has been exposed to tobacco smoke. She has never used smokeless tobacco. She reports current alcohol use. She reports that she does not use drugs.   Family History:   Family History   Problem Relation Age of Onset    Supraventricular tachycardia  Mother     Hypertension Father     No Known Problems Sister     Other Maternal Aunt         heart skips beat     Home Medications:  Prior to Admission medications    Medication Sig Start Date End Date Taking? Authorizing Provider   metoprolol succinate (TOPROL XL) 25 MG extended release tablet TAKE ONE HALF TAB BY MOUTH DAILY 7/23/24   Smitha Greenfield MD

## 2024-07-26 NOTE — PATIENT INSTRUCTIONS
Plan:   Complete echo - call to schedule [phone]  - will call you with echo results   Continue Entresto- adjust to 1/2 tab of the 49/51mg twice a day  Check BMP lab in about 1-2 weeks after you increase med  Continue Toprol 1/2 tab daily  Follow up 3 months

## 2024-07-28 PROBLEM — I50.20 HEART FAILURE WITH REDUCED EJECTION FRACTION (HCC): Status: ACTIVE | Noted: 2024-07-28

## 2024-08-07 DIAGNOSIS — I42.6 ALCOHOLIC CARDIOMYOPATHY (HCC): ICD-10-CM

## 2024-08-07 DIAGNOSIS — I50.20 HEART FAILURE WITH REDUCED EJECTION FRACTION (HCC): ICD-10-CM

## 2024-08-07 DIAGNOSIS — O13.9 GESTATIONAL HYPERTENSION, ANTEPARTUM: Primary | ICD-10-CM

## 2024-08-07 DIAGNOSIS — I50.22 CHRONIC SYSTOLIC HEART FAILURE (HCC): Primary | ICD-10-CM

## 2024-08-07 NOTE — PROGRESS NOTES
Epic crashed about 30 minutes ago so lab for CMP did not go through  I have been asked to re-place the lab order

## 2024-08-28 ENCOUNTER — HOSPITAL ENCOUNTER (OUTPATIENT)
Age: 44
Discharge: HOME | End: 2024-08-30
Payer: COMMERCIAL

## 2024-08-28 VITALS
HEIGHT: 63 IN | SYSTOLIC BLOOD PRESSURE: 126 MMHG | DIASTOLIC BLOOD PRESSURE: 70 MMHG | WEIGHT: 151 LBS | BODY MASS INDEX: 26.75 KG/M2

## 2024-08-28 DIAGNOSIS — I50.20 HEART FAILURE WITH REDUCED EJECTION FRACTION (HCC): ICD-10-CM

## 2024-08-28 DIAGNOSIS — R07.9 CHEST PAIN, UNSPECIFIED TYPE: ICD-10-CM

## 2024-08-28 DIAGNOSIS — I42.9 CARDIOMYOPATHY, UNSPECIFIED TYPE (HCC): ICD-10-CM

## 2024-08-28 DIAGNOSIS — I44.7 LEFT BUNDLE BRANCH BLOCK: Primary | ICD-10-CM

## 2024-08-28 LAB
ECHO AO ROOT DIAM: 2.8 CM
ECHO AO ROOT INDEX: 1.63 CM/M2
ECHO AV CUSP MM: 1.6 CM
ECHO AV PEAK GRADIENT: 8 MMHG
ECHO AV PEAK VELOCITY: 1.4 M/S
ECHO BSA: 1.74 M2
ECHO LA AREA 2C: 17.6 CM2
ECHO LA AREA 4C: 14 CM2
ECHO LA DIAMETER INDEX: 2.09 CM/M2
ECHO LA DIAMETER: 3.6 CM
ECHO LA MAJOR AXIS: 4.3 CM
ECHO LA MINOR AXIS: 4.8 CM
ECHO LA TO AORTIC ROOT RATIO: 1.29
ECHO LA VOL BP: 44 ML (ref 22–52)
ECHO LA VOL MOD A2C: 53 ML (ref 22–52)
ECHO LA VOL MOD A4C: 34 ML (ref 22–52)
ECHO LA VOL/BSA BIPLANE: 26 ML/M2 (ref 16–34)
ECHO LA VOLUME INDEX MOD A2C: 31 ML/M2 (ref 16–34)
ECHO LA VOLUME INDEX MOD A4C: 20 ML/M2 (ref 16–34)
ECHO LV E' LATERAL VELOCITY: 14 CM/S
ECHO LV E' SEPTAL VELOCITY: 10 CM/S
ECHO LV EF PHYSICIAN: 43 %
ECHO LV FRACTIONAL SHORTENING: 39 % (ref 28–44)
ECHO LV INTERNAL DIMENSION DIASTOLE INDEX: 2.85 CM/M2
ECHO LV INTERNAL DIMENSION DIASTOLIC: 4.9 CM (ref 3.9–5.3)
ECHO LV INTERNAL DIMENSION SYSTOLIC INDEX: 1.74 CM/M2
ECHO LV INTERNAL DIMENSION SYSTOLIC: 3 CM
ECHO LV ISOVOLUMETRIC RELAXATION TIME (IVRT): 77 MS
ECHO LV IVSD: 0.7 CM (ref 0.6–0.9)
ECHO LV MASS 2D: 110.8 G (ref 67–162)
ECHO LV MASS INDEX 2D: 64.4 G/M2 (ref 43–95)
ECHO LV POSTERIOR WALL DIASTOLIC: 0.7 CM (ref 0.6–0.9)
ECHO LV RELATIVE WALL THICKNESS RATIO: 0.29
ECHO MV A VELOCITY: 0.63 M/S
ECHO MV E VELOCITY: 1.1 M/S
ECHO MV E/A RATIO: 1.75
ECHO MV E/E' LATERAL: 7.86
ECHO MV E/E' RATIO (AVERAGED): 9.43
ECHO MV E/E' SEPTAL: 11
ECHO PV MAX VELOCITY: 1.4 M/S
ECHO PV PEAK GRADIENT: 8 MMHG
ECHO RA AREA 4C: 11.5 CM2
ECHO RA END SYSTOLIC VOLUME APICAL 4 CHAMBER INDEX BSA: 16 ML/M2
ECHO RA VOLUME: 27 ML
ECHO RV BASAL DIMENSION: 3.2 CM
ECHO RV FREE WALL PEAK S': 14 CM/S
ECHO RV LONGITUDINAL DIMENSION: 7.5 CM
ECHO RV MID DIMENSION: 2.5 CM
ECHO RV TAPSE: 2.4 CM (ref 1.7–?)
ECHO TV PEAK GRADIENT: 2 MMHG

## 2024-08-28 PROCEDURE — 93306 TTE W/DOPPLER COMPLETE: CPT

## 2024-08-28 PROCEDURE — 93306 TTE W/DOPPLER COMPLETE: CPT | Performed by: INTERNAL MEDICINE

## 2024-10-23 DIAGNOSIS — I50.20 HEART FAILURE WITH REDUCED EJECTION FRACTION (HCC): ICD-10-CM

## 2024-10-23 DIAGNOSIS — I42.8 NONISCHEMIC CARDIOMYOPATHY (HCC): ICD-10-CM

## 2024-10-24 RX ORDER — METOPROLOL SUCCINATE 25 MG/1
TABLET, EXTENDED RELEASE ORAL
Qty: 45 TABLET | Refills: 3 | Status: SHIPPED | OUTPATIENT
Start: 2024-10-24

## 2025-01-17 ENCOUNTER — OFFICE VISIT (OUTPATIENT)
Dept: CARDIOLOGY CLINIC | Age: 45
End: 2025-01-17
Payer: COMMERCIAL

## 2025-01-17 VITALS
HEIGHT: 63 IN | BODY MASS INDEX: 28.02 KG/M2 | HEART RATE: 93 BPM | WEIGHT: 158.12 LBS | SYSTOLIC BLOOD PRESSURE: 94 MMHG | OXYGEN SATURATION: 97 % | DIASTOLIC BLOOD PRESSURE: 58 MMHG

## 2025-01-17 DIAGNOSIS — I42.8 NONISCHEMIC CARDIOMYOPATHY (HCC): ICD-10-CM

## 2025-01-17 DIAGNOSIS — I42.9 CARDIOMYOPATHY, UNSPECIFIED TYPE (HCC): ICD-10-CM

## 2025-01-17 DIAGNOSIS — I50.20 HEART FAILURE WITH REDUCED EJECTION FRACTION (HCC): ICD-10-CM

## 2025-01-17 PROCEDURE — 99214 OFFICE O/P EST MOD 30 MIN: CPT | Performed by: NURSE PRACTITIONER

## 2025-01-17 RX ORDER — METOPROLOL SUCCINATE 25 MG/1
12.5 TABLET, EXTENDED RELEASE ORAL DAILY
Qty: 45 TABLET | Refills: 3 | Status: SHIPPED | OUTPATIENT
Start: 2025-01-17

## 2025-01-17 NOTE — PROGRESS NOTES
[facility]  Heart Failure Follow-up    Primary Care Doctor:  No primary care provider on file.    Chief Complaint   Patient presents with    Follow-up    Cardiomyopathy    heart failure with reduced ejection fraction      History of Present Illness:  I had the pleasure of seeing Riley Woods in follow up for cardiomyopathy.     Since last visit, echo showed improving LVEF 45%.   Continues with some fatigue.   She didn't have her meds for 5 days didn't have any symptoms of CHF while off of her meds.   Smoking 3-5 cigs. Still trying to quit.   She has been getting an occasional chest pain sharp with sitting random comes and goes, few seconds.   Sometimes yoga 20 minutes a few times a week.     Riley Woods describes symptoms including fatigue but denies chest pressure/discomfort, dizziness, lower extremity edema, palpitations.     Past Medical History:   has a past medical history of Dizziness, Gestational diabetes, Hypertension, Left bundle branch block, and Nonischemic cardiomyopathy (HCC).  Surgical History:   has a past surgical history that includes Appendectomy (2002) and other surgical history (Left, 8/21/15).   Social History:   reports that she has been smoking cigarettes. She has been exposed to tobacco smoke. She has never used smokeless tobacco. She reports current alcohol use. She reports that she does not use drugs.   Family History:   Family History   Problem Relation Age of Onset    Supraventricular tachycardia  Mother     Hypertension Father     No Known Problems Sister     Other Maternal Aunt         heart skips beat     Home Medications:  Prior to Admission medications    Medication Sig Start Date End Date Taking? Authorizing Provider   metoprolol succinate (TOPROL XL) 25 MG extended release tablet TAKE ONE HALF (1/2) TABLET BY MOUTH DAILY 10/24/24  Yes Soha Lorenzo, APRN - CNP   sacubitril-valsartan (ENTRESTO) 24-26 MG per tablet Take 1 tablet by mouth 2 times daily 7/29/24

## 2025-01-17 NOTE — PATIENT INSTRUCTIONS
Continue Entresto 24/26mg twice a day   Complete labs  fasting   Continue Toprol 1/2 tab daily  Apply for patient assistance   Follow up 6 months

## 2025-02-17 ENCOUNTER — HOSPITAL ENCOUNTER (OUTPATIENT)
Age: 45
Discharge: HOME | End: 2025-02-17
Payer: COMMERCIAL

## 2025-02-17 DIAGNOSIS — I50.20 HEART FAILURE WITH REDUCED EJECTION FRACTION (HCC): ICD-10-CM

## 2025-02-17 DIAGNOSIS — I42.9 CARDIOMYOPATHY, UNSPECIFIED TYPE (HCC): ICD-10-CM

## 2025-02-17 DIAGNOSIS — I42.8 NONISCHEMIC CARDIOMYOPATHY (HCC): ICD-10-CM

## 2025-02-17 LAB
25(OH)D3 SERPL-MCNC: 17.7 NG/ML
ALBUMIN SERPL-MCNC: 4.5 G/DL (ref 3.4–5)
ALBUMIN/GLOB SERPL: 1.7 {RATIO} (ref 1.1–2.2)
ALP SERPL-CCNC: 69 U/L (ref 40–129)
ALT SERPL-CCNC: 16 U/L (ref 10–40)
ANION GAP SERPL CALCULATED.3IONS-SCNC: 11 MMOL/L (ref 3–16)
AST SERPL-CCNC: 18 U/L (ref 15–37)
BILIRUB SERPL-MCNC: 0.6 MG/DL (ref 0–1)
BUN SERPL-MCNC: 11 MG/DL (ref 7–20)
CALCIUM SERPL-MCNC: 9.2 MG/DL (ref 8.3–10.6)
CHLORIDE SERPL-SCNC: 107 MMOL/L (ref 99–110)
CHOLEST SERPL-MCNC: 192 MG/DL (ref 0–199)
CO2 SERPL-SCNC: 23 MMOL/L (ref 21–32)
CREAT SERPL-MCNC: 0.7 MG/DL (ref 0.6–1.1)
DEPRECATED RDW RBC AUTO: 13.1 % (ref 12.4–15.4)
GFR SERPLBLD CREATININE-BSD FMLA CKD-EPI: >90 ML/MIN/{1.73_M2}
GLUCOSE SERPL-MCNC: 136 MG/DL (ref 70–99)
HCT VFR BLD AUTO: 38.5 % (ref 36–48)
HDLC SERPL-MCNC: 43 MG/DL (ref 40–60)
HGB BLD-MCNC: 13.1 G/DL (ref 12–16)
LDL CHOLESTEROL: 125 MG/DL
MCH RBC QN AUTO: 31.2 PG (ref 26–34)
MCHC RBC AUTO-ENTMCNC: 33.9 G/DL (ref 31–36)
MCV RBC AUTO: 91.9 FL (ref 80–100)
NT-PROBNP SERPL-MCNC: 80 PG/ML (ref 0–124)
PLATELET # BLD AUTO: 187 K/UL (ref 135–450)
PMV BLD AUTO: 11.7 FL (ref 5–10.5)
POTASSIUM SERPL-SCNC: 4.7 MMOL/L (ref 3.5–5.1)
PROT SERPL-MCNC: 7.1 G/DL (ref 6.4–8.2)
RBC # BLD AUTO: 4.19 M/UL (ref 4–5.2)
SODIUM SERPL-SCNC: 141 MMOL/L (ref 136–145)
TRIGL SERPL-MCNC: 121 MG/DL (ref 0–150)
VLDLC SERPL CALC-MCNC: 24 MG/DL
WBC # BLD AUTO: 7.1 K/UL (ref 4–11)

## 2025-02-17 PROCEDURE — 36415 COLL VENOUS BLD VENIPUNCTURE: CPT

## 2025-02-17 PROCEDURE — 85027 COMPLETE CBC AUTOMATED: CPT

## 2025-02-17 PROCEDURE — 83880 ASSAY OF NATRIURETIC PEPTIDE: CPT

## 2025-02-17 PROCEDURE — 80061 LIPID PANEL: CPT

## 2025-02-17 PROCEDURE — 80053 COMPREHEN METABOLIC PANEL: CPT

## 2025-02-17 PROCEDURE — 82306 VITAMIN D 25 HYDROXY: CPT

## 2025-02-19 ENCOUNTER — TELEPHONE (OUTPATIENT)
Dept: CARDIOLOGY CLINIC | Age: 45
End: 2025-02-19

## 2025-02-19 DIAGNOSIS — E55.9 VITAMIN D DEFICIENCY: Primary | ICD-10-CM

## 2025-02-19 RX ORDER — ERGOCALCIFEROL 1.25 MG/1
50000 CAPSULE, LIQUID FILLED ORAL WEEKLY
Qty: 12 CAPSULE | Refills: 1 | Status: SHIPPED | OUTPATIENT
Start: 2025-02-19

## 2025-02-19 NOTE — TELEPHONE ENCOUNTER
----- Message from ULICES Trujillo CNP sent at 2/18/2025  1:47 PM EST -----  (I am covering for Dinah Lorenzo CNP  who is away from the office today.)   Vitamin D low. LDL elevated. Glucose elevated.   BNP, CBC and metabolic panel stable.   Recommend dietary changes to monitor sugars and saturated fat.  Ideally should have PCP to address elevated blood sugar.   Start vitamin D 50,000 units weekly for 12 weeks.  Repeat vitamin D in 12 weeks to determine further dosing.   ULICES Hare CNP

## 2025-04-07 ENCOUNTER — TELEPHONE (OUTPATIENT)
Dept: CARDIOLOGY CLINIC | Age: 45
End: 2025-04-07

## 2025-04-07 NOTE — TELEPHONE ENCOUNTER
Riley Woods  1980  [phone]    Medication Refill    Medication needing refilled: metoprolol    Dosage of the medication: 25 mg    How are you taking this medication (QD, BID, TID, QID, PRN): 0.5 mg qd    30 or 90 day supply called in: 90    Which Pharmacy are we sending the medication to?: [facility]    Last OV: 01.17.25-[facility]     Next OV: 06.27.25-[facility]    When will you run out of your medication: tomorrow